# Patient Record
Sex: MALE | Race: WHITE | Employment: OTHER | URBAN - METROPOLITAN AREA
[De-identification: names, ages, dates, MRNs, and addresses within clinical notes are randomized per-mention and may not be internally consistent; named-entity substitution may affect disease eponyms.]

---

## 2017-06-28 ENCOUNTER — OFFICE VISIT (OUTPATIENT)
Dept: NEUROLOGY | Age: 67
End: 2017-06-28

## 2017-06-28 VITALS
RESPIRATION RATE: 16 BRPM | HEIGHT: 71 IN | DIASTOLIC BLOOD PRESSURE: 66 MMHG | OXYGEN SATURATION: 98 % | SYSTOLIC BLOOD PRESSURE: 124 MMHG | HEART RATE: 83 BPM | BODY MASS INDEX: 31.78 KG/M2 | WEIGHT: 227 LBS

## 2017-06-28 DIAGNOSIS — G25.0 ESSENTIAL TREMOR: ICD-10-CM

## 2017-06-28 DIAGNOSIS — R42 DIZZINESS AND GIDDINESS: ICD-10-CM

## 2017-06-28 DIAGNOSIS — I65.23 STENOSIS OF BOTH INTERNAL CAROTID ARTERIES: ICD-10-CM

## 2017-06-28 RX ORDER — FINASTERIDE 5 MG/1
5 TABLET, FILM COATED ORAL DAILY
Refills: 0 | COMMUNITY
Start: 2017-06-12

## 2017-06-28 RX ORDER — PRIMIDONE 50 MG/1
TABLET ORAL
Qty: 450 TAB | Refills: 5 | Status: SHIPPED | OUTPATIENT
Start: 2017-06-28 | End: 2018-01-16 | Stop reason: SDUPTHER

## 2017-06-28 NOTE — PROGRESS NOTES
Consult    Subjective:     Stephanie Velez is a 61 y.o. right-handed  male seen for evaluation of New problem of progressive difficulty with his tremors, more difficulty with his writing and signing checks, and patient has benign essential tremor and seen at the referral of Dr. Scar Laura. Patient already on Mysoline 100 mg twice a day, seems to be tolerating the medication well without drowsiness or sedation or ataxia or too much dizziness. He had some previous vertigo for which she saw ENT and was given vestibular therapy with slow gradual improvement in his symptoms and he seems to be more asymptomatic from that other than occasional slight lightheadedness when he stands up quickly. However he is also on 2 prostate medications that cause orthostasis, but he does have a rising PSA titer. Have been no history of cancer, just benign prostatic hypertrophy. We did a carotid Doppler study last year in the office that was unremarkable. He's had no other new focal weakness, sensory loss, visual change, or other new neurological problems except as above or other neurological problems except as above. We discussed various options for his tremor including ultrasound and deep brain stimulation and gave him information on deep brain stimulation. He's had normal thyroid testing in the past. He occasionally has to use 2 hands to hold a glass of liquid, and sometimes has to drink a glass of wine to sign checks more appropriately. . Stress and tension and fatigue makes his tremors worse, and the medicine helps, and occasionally wine, but not much else. He's had no weakness no sensory loss no headaches no visual symptoms no gait or balance problems. He had no family history of tremors. He's had no other new neurological symptoms, and is now retired. He has no family history of tremor    On complete review of systems and symptoms he has had no new medical problems complications or illnesses.  He has a history of essential tremor, hypertension, hyperlipidemia. Past Medical History:   Diagnosis Date    Enlarged prostate     Essential hypertension     Hyperlipemia     Sleep apnea       Past Surgical History:   Procedure Laterality Date    ABDOMEN SURGERY PROC UNLISTED       Family History   Problem Relation Age of Onset   [facility] Cancer Mother      breast    Dementia Father     Cancer Father      bladder      Social History   Substance Use Topics    Smoking status: Never Smoker    Smokeless tobacco: Never Used    Alcohol use Yes      Comment: socially       Current Outpatient Prescriptions   Medication Sig Dispense Refill    finasteride (PROSCAR) 5 mg tablet 5 mg daily. 0    primidone (MYSOLINE) 50 mg tablet 2 po QAM and 3 po  Tab 5    tamsulosin (FLOMAX) 0.4 mg capsule 0.4 mg daily. 0    simvastatin (ZOCOR) 40 mg tablet 20 mg nightly.  lisinopril (PRINIVIL, ZESTRIL) 10 mg tablet 10 mg daily.  aspirin delayed-release 81 mg tablet Take  by mouth daily.  Multivit,Ca,Iron-FA-Lyco-Lut (CENTRAL-JERMAINE) -578-250 mg-mcg-mcg-mcg tab Take  by mouth.  Cholecalciferol, Vitamin D3, (VITAMIN D3) 1,000 unit cap Take 1,000 Units by mouth daily. No Known Allergies     Review of Systems:  A comprehensive review of systems was negative except for: Neurological: positive for tremor   Vitals:    06/28/17 1112   BP: 124/66   Pulse: 83   Resp: 16   SpO2: 98%   Weight: 227 lb (103 kg)   Height: 5' 11\" (1.803 m)     Objective:     I      NEUROLOGICAL EXAM:    Appearance: The patient is well developed, well nourished, provides a coherent history and is in no acute distress. Mental Status: Oriented to time, place and person, and the president, cognitive function is normal, speech is fluent. Mood and affect appropriate. Cranial Nerves:   Intact visual fields. Fundi are benign. JACQUELINE, EOM's full, no nystagmus, no ptosis. Facial sensation is normal. Corneal reflexes are not tested.  Facial movement is symmetric. Hearing is normal bilaterally. Palate is midline with normal sternocleidomastoid and trapezius muscles are normal. Tongue is midline. Neck without meningismus or bruits  Temporal arteries are not tender or enlarged    Motor:  5/5 strength in upper and lower proximal and distal muscles. Normal bulk and tone. No fasciculations. Reflexes:   Deep tendon reflexes 2+/4 and symmetrical.  No babinski or clonus present  Patient has positive head hanging position vertigo when he sits up quickly, worse with head tilted to the right suggesting right vestibular dysfunction  Hearing is mildly decreased only   Sensory:   Normal to touch, pinprick and vibration and temperature. DSS is intact   Gait:  Normal gait. Tremor: Moderate bilateral intention tremor, right side as bad as the left   Cerebellar:  No cerebellar signs present. Neurovascular:  Normal heart sounds and regular rhythm, peripheral pulses decreased, and no carotid bruits. Assessment:         Plan:     Benign essential tremor that is progressive despite Mysoline 100 mg twice a day  Patient has probable benign positional vertigo, better after vestibular therapy  We will try to increase his Mysoline to 100 mg in the morning and 150 mg at night  His last levels showed a low phenobarbital level but a slightly elevated Mysoline level probably reflecting his taking the medication  New prescription given to patient today  We had long discussion with patient about other options like deep brain stimulation or ultrasound therapy  Information on deep brain stimulation given to the patient today  35 minutes spent with patient today discussing his prognosis, further treatment options and therapy  He is advised to take a multivitamin and vitamin D.  On a daily basis, remain mentally and physically active, exercise regularly  Followup in 6 months time or earlier if needed and patient will call for refills when needed    Signed By: Patricia Galindo MD June 28, 2017       This note will not be viewable in [address]

## 2017-06-28 NOTE — PATIENT INSTRUCTIONS

## 2017-06-28 NOTE — MR AVS SNAPSHOT
Visit Information Date & Time Provider Department Dept. Phone Encounter #  
 6/28/2017 11:00 AM Héctor Caba MD [facility] [phone] [id number] Follow-up Instructions Return in about 6 months (around 12/28/2017). Upcoming Health Maintenance Date Due Hepatitis C Screening 1950 DTaP/Tdap/Td series (1 - Tdap) 8/29/1973 FOBT Q 1 YEAR AGE 50-75 8/29/2002 ZOSTER VACCINE AGE 60> 8/29/2012 INFLUENZA AGE 9 TO ADULT 8/1/2017 Allergies as of 6/28/2017  Review Complete On: 6/28/2017 By: Héctor Caba MD  
 No Known Allergies Current Immunizations  Never Reviewed No immunizations on file. Not reviewed this visit You Were Diagnosed With   
  
 Codes Comments Vertigo of central origin of right ear    -  Primary ICD-10-CM: H81.41 
ICD-9-CM: 386.2 Essential tremor     ICD-10-CM: G25.0 ICD-9-CM: 333.1 Stenosis of both internal carotid arteries     ICD-10-CM: I65.23 ICD-9-CM: 433.10, 433.30 Dizziness and giddiness     ICD-10-CM: D43 ICD-9-CM: 780.4 Vitals BP Pulse Resp Height(growth percentile) Weight(growth percentile) SpO2  
 124/66 83 16 5' 11\" (1.803 m) 227 lb (103 kg) 98% BMI Smoking Status 31.66 kg/m2 Never Smoker Vitals History BMI and BSA Data Body Mass Index Body Surface Area  
 31.66 kg/m 2 2.27 m 2 Preferred Pharmacy Pharmacy Name Phone [id number] & [address] [phone] Your Updated Medication List  
  
   
This list is accurate as of: 6/28/17 11:45 AM.  Always use your most recent med list.  
  
  
  
  
 aspirin delayed-release 81 mg tablet Take  by mouth daily. CENTRAL-JERMAINE -041-250 mg-mcg-mcg-mcg Tab Generic drug:  Multivit,Ca,Iron-FA-Lyco-Lut Take  by mouth. finasteride 5 mg tablet Commonly known as:  PROSCAR  
5 mg daily. lisinopril 10 mg tablet Commonly known as:  PRINIVIL, ZESTRIL  
10 mg daily. primidone 50 mg tablet Commonly known as: MYSOLINE  
2 po QAM and 3 po QHS  
  
 simvastatin 40 mg tablet Commonly known as:  ZOCOR  
20 mg nightly. tamsulosin 0.4 mg capsule Commonly known as:  FLOMAX  
0.4 mg daily. VITAMIN D3 1,000 unit Cap Generic drug:  cholecalciferol Take 1,000 Units by mouth daily. Prescriptions Sent to Pharmacy Refills  
 primidone (MYSOLINE) 50 mg tablet 5 Si po QAM and 3 po QHS Class: Normal  
 Pharmacy: [address], [address] [facility] Ph #: [phone] Follow-up Instructions Return in about 6 months (around 2017). Patient Instructions A Healthy Lifestyle: Care Instructions Your Care Instructions A healthy lifestyle can help you feel good, stay at a healthy weight, and have plenty of energy for both work and play. A healthy lifestyle is something you can share with your whole family. A healthy lifestyle also can lower your risk for serious health problems, such as high blood pressure, heart disease, and diabetes. You can follow a few steps listed below to improve your health and the health of your family. Follow-up care is a key part of your treatment and safety. Be sure to make and go to all appointments, and call your doctor if you are having problems. Its also a good idea to know your test results and keep a list of the medicines you take. How can you care for yourself at home? · Do not eat too much sugar, fat, or fast foods. You can still have dessert and treats now and then. The goal is moderation. · Start small to improve your eating habits. Pay attention to portion sizes, drink less juice and soda pop, and eat more fruits and vegetables. ¨ Eat a healthy amount of food. A 3-ounce serving of meat, for example, is about the size of a deck of cards.  Fill the rest of your plate with vegetables and whole grains. ¨ Limit the amount of soda and sports drinks you have every day. Drink more water when you are thirsty. ¨ Eat at least 5 servings of fruits and vegetables every day. It may seem like a lot, but it is not hard to reach this goal. A serving or helping is 1 piece of fruit, 1 cup of vegetables, or 2 cups of leafy, raw vegetables. Have an apple or some carrot sticks as an afternoon snack instead of a candy bar. Try to have fruits and/or vegetables at every meal. 
· Make exercise part of your daily routine. You may want to start with simple activities, such as walking, bicycling, or slow swimming. Try to be active 30 to 60 minutes every day. You do not need to do all 30 to 60 minutes all at once. For example, you can exercise 3 times a day for 10 or 20 minutes. Moderate exercise is safe for most people, but it is always a good idea to talk to your doctor before starting an exercise program. 
· Keep moving. J[employer] the lawn, work in the garden, or Sharypic. Take the stairs instead of the elevator at work. · If you smoke, quit. People who smoke have an increased risk for heart attack, stroke, cancer, and other lung illnesses. Quitting is hard, but there are ways to boost your chance of quitting tobacco for good. ¨ Use nicotine gum, patches, or lozenges. ¨ Ask your doctor about stop-smoking programs and medicines. ¨ Keep trying. In addition to reducing your risk of diseases in the future, you will notice some benefits soon after you stop using tobacco. If you have shortness of breath or asthma symptoms, they will likely get better within a few weeks after you quit. · Limit how much alcohol you drink. Moderate amounts of alcohol (up to 2 drinks a day for men, 1 drink a day for women) are okay. But drinking too much can lead to liver problems, high blood pressure, and other health problems. Family health If you have a family, there are many things you can do together to improve your health. · Eat meals together as a family as often as possible. · Eat healthy foods. This includes fruits, vegetables, lean meats and dairy, and whole grains. · Include your family in your fitness plan. Most people think of activities such as jogging or tennis as the way to fitness, but there are many ways you and your family can be more active. Anything that makes you breathe hard and gets your heart pumping is exercise. Here are some tips: 
¨ Walk to do errands or to take your child to school or the bus. ¨ Go for a family bike ride after dinner instead of watching TV. Where can you learn more? Go to http://wadeAquaMobilemandy.info/. Enter U733 in the search box to learn more about \"A Healthy Lifestyle: Care Instructions. \" Current as of: July 26, 2016 Content Version: 11.3 © 2306-1397 [employer]. Care instructions adapted under license by [employer] (which disclaims liability or warranty for this information). If you have questions about a medical condition or this instruction, always ask your healthcare professional. [place] [address] any warranty or liability for your use of this information. Introducing [facility] [place] [facility]! [employer] introduces [employer] patient portal. Now you can access parts of your medical record, email your doctor's office, and request medication refills online. 1. In your internet browser, go to [email or web address] 2. Click on the First Time User? Click Here link in the Sign In box. You will see the New Member Sign Up page. 3. Enter your [employer] Access Code exactly as it appears below. You will not need to use this code after youve completed the sign-up process. If you do not sign up before the expiration date, you must request a new code. · [employer] Access Code: [email or web address] Expires: 9/26/2017 11:15 AM 
 
4.  Enter the last four digits of your Social Security Number (xxxx) and Date of Birth (mm/dd/yyyy) as indicated and click Submit. You will be taken to the next sign-up page. 5. Create a iWarda ID. This will be your iWarda login ID and cannot be changed, so think of one that is secure and easy to remember. 6. Create a iWarda password. You can change your password at any time. 7. Enter your Password Reset Question and Answer. This can be used at a later time if you forget your password. 8. Enter your e-mail address. You will receive e-mail notification when new information is available in [address] 9. Click Sign Up. You can now view and download portions of your medical record. 10. Click the Download Summary menu link to download a portable copy of your medical information. If you have questions, please visit the Frequently Asked Questions section of the iWarda website. Remember, iWarda is NOT to be used for urgent needs. For medical emergencies, dial 911. Now available from your iPhone and Android! Please provide this summary of care documentation to your next provider. Your primary care clinician is listed as Roxane Hong. If you have any questions after today's visit, please call [phone].

## 2017-06-28 NOTE — LETTER
6/28/2017 11:57 AM 
 
Patient:  Veronica Kidd YOB: 1950 Date of Visit: 6/28/2017 Dear No Recipients: Thank you for referring Mr. Veronica Kidd to me for evaluation/treatment. Below are the relevant portions of my assessment and plan of care. Consult Subjective:  
 
Veronica Kidd is a 61 y.o. right-handed  male seen for evaluation of New problem of progressive difficulty with his tremors, more difficulty with his writing and signing checks, and patient has benign essential tremor and seen at the referral of Dr. Gustavo Denis. Patient already on Mysoline 100 mg twice a day, seems to be tolerating the medication well without drowsiness or sedation or ataxia or too much dizziness. He had some previous vertigo for which she saw ENT and was given vestibular therapy with slow gradual improvement in his symptoms and he seems to be more asymptomatic from that other than occasional slight lightheadedness when he stands up quickly. However he is also on 2 prostate medications that cause orthostasis, but he does have a rising PSA titer. Have been no history of cancer, just benign prostatic hypertrophy. We did a carotid Doppler study last year in the office that was unremarkable. He's had no other new focal weakness, sensory loss, visual change, or other new neurological problems except as above or other neurological problems except as above. We discussed various options for his tremor including ultrasound and deep brain stimulation and gave him information on deep brain stimulation. He's had normal thyroid testing in the past. He occasionally has to use 2 hands to hold a glass of liquid, and sometimes has to drink a glass of wine to sign checks more appropriately. . Stress and tension and fatigue makes his tremors worse, and the medicine helps, and occasionally wine, but not much else.  He's had no weakness no sensory loss no headaches no visual symptoms no gait or balance problems. He had no family history of tremors. He's had no other new neurological symptoms, and is now retired. He has no family history of tremor On complete review of systems and symptoms he has had no new medical problems complications or illnesses. He has a history of essential tremor, hypertension, hyperlipidemia. Past Medical History:  
Diagnosis Date  Enlarged prostate  Essential hypertension  Hyperlipemia  Sleep apnea Past Surgical History:  
Procedure Laterality Date [address] UNLISTED Family History Problem Relation Age of Onset  Cancer Mother   
  breast  
 Dementia Father  Cancer Father   
  bladder Social History Substance Use Topics  Smoking status: Never Smoker  Smokeless tobacco: Never Used  Alcohol use Yes Comment: socially Current Outpatient Prescriptions Medication Sig Dispense Refill  finasteride (PROSCAR) 5 mg tablet 5 mg daily. 0  
 primidone (MYSOLINE) 50 mg tablet 2 po QAM and 3 po  Tab 5  tamsulosin (FLOMAX) 0.4 mg capsule 0.4 mg daily. 0  
 simvastatin (ZOCOR) 40 mg tablet 20 mg nightly.  lisinopril (PRINIVIL, ZESTRIL) 10 mg tablet 10 mg daily.  aspirin delayed-release 81 mg tablet Take  by mouth daily.  Multivit,Ca,Iron-FA-Lyco-Lut (CENTRAL-JERMAINE) -512-250 mg-mcg-mcg-mcg tab Take  by mouth.  Cholecalciferol, Vitamin D3, (VITAMIN D3) 1,000 unit cap Take 1,000 Units by mouth daily. No Known Allergies Review of Systems: A comprehensive review of systems was negative except for: Neurological: positive for tremor Vitals:  
 06/28/17 1112 BP: 124/66 Pulse: 83 Resp: 16 SpO2: 98% Weight: 227 lb (103 kg) Height: 5' 11\" (1.803 m) Objective: I 
 
 
NEUROLOGICAL EXAM: 
 
Appearance:   The patient is well developed, well nourished, provides a coherent history and is in no acute distress. Mental Status: Oriented to time, place and person, and the president, cognitive function is normal, speech is fluent. Mood and affect appropriate. Cranial Nerves:   Intact visual fields. Fundi are benign. JACQUELINE, EOM's full, no nystagmus, no ptosis. Facial sensation is normal. Corneal reflexes are not tested. Facial movement is symmetric. Hearing is normal bilaterally. Palate is midline with normal sternocleidomastoid and trapezius muscles are normal. Tongue is midline. Neck without meningismus or bruits Temporal arteries are not tender or enlarged Motor:  5/5 strength in upper and lower proximal and distal muscles. Normal bulk and tone. No fasciculations. Reflexes:   Deep tendon reflexes 2+/4 and symmetrical. 
No babinski or clonus present Patient has positive head hanging position vertigo when he sits up quickly, worse with head tilted to the right suggesting right vestibular dysfunction Hearing is mildly decreased only Sensory:   Normal to touch, pinprick and vibration and temperature. DSS is intact Gait:  Normal gait. Tremor: Moderate bilateral intention tremor, right side as bad as the left Cerebellar:  No cerebellar signs present. Neurovascular:  Normal heart sounds and regular rhythm, peripheral pulses decreased, and no carotid bruits. Assessment:  
 
 
 
Plan:  
 
Benign essential tremor that is progressive despite Mysoline 100 mg twice a day Patient has probable benign positional vertigo, better after vestibular therapy We will try to increase his Mysoline to 100 mg in the morning and 150 mg at night His last levels showed a low phenobarbital level but a slightly elevated Mysoline level probably reflecting his taking the medication New prescription given to patient today We had long discussion with patient about other options like deep brain stimulation or ultrasound therapy Information on deep brain stimulation given to the patient today 35 minutes spent with patient today discussing his prognosis, further treatment options and therapy He is advised to take a multivitamin and vitamin D. On a daily basis, remain mentally and physically active, exercise regularly Followup in 6 months time or earlier if needed and patient will call for refills when needed Signed By: Blanche Romano MD   
 June 28, 2017 This note will not be viewable in [address] If you have questions, please do not hesitate to call me. I look forward to following Mr. Valdez Mason along with you. Sincerely, Blanche Romano MD

## 2018-01-16 ENCOUNTER — OFFICE VISIT (OUTPATIENT)
Dept: NEUROLOGY | Age: 68
End: 2018-01-16

## 2018-01-16 VITALS
OXYGEN SATURATION: 98 % | HEIGHT: 71 IN | HEART RATE: 76 BPM | SYSTOLIC BLOOD PRESSURE: 140 MMHG | BODY MASS INDEX: 30.94 KG/M2 | DIASTOLIC BLOOD PRESSURE: 76 MMHG | WEIGHT: 221 LBS

## 2018-01-16 DIAGNOSIS — I65.23 STENOSIS OF BOTH INTERNAL CAROTID ARTERIES: ICD-10-CM

## 2018-01-16 DIAGNOSIS — G25.0 ESSENTIAL TREMOR: Primary | ICD-10-CM

## 2018-01-16 DIAGNOSIS — R42 DIZZINESS AND GIDDINESS: ICD-10-CM

## 2018-01-16 RX ORDER — PRIMIDONE 50 MG/1
TABLET ORAL
Qty: 150 TAB | Refills: 11 | Status: SHIPPED | OUTPATIENT
Start: 2018-01-16 | End: 2019-02-01 | Stop reason: SDUPTHER

## 2018-01-16 RX ORDER — MECLIZINE HYDROCHLORIDE 25 MG/1
25 TABLET ORAL
Qty: 100 TAB | Refills: 11 | Status: SHIPPED | OUTPATIENT
Start: 2018-01-16 | End: 2018-01-26

## 2018-01-16 NOTE — PATIENT INSTRUCTIONS
Please be advised there is a $25 fee for all paperwork to be completed from our  providers. This is to be paid by the patient prior to picking up the completed forms. A Healthy Lifestyle: Care Instructions  Your Care Instructions    A healthy lifestyle can help you feel good, stay at a healthy weight, and have plenty of energy for both work and play. A healthy lifestyle is something you can share with your whole family. A healthy lifestyle also can lower your risk for serious health problems, such as high blood pressure, heart disease, and diabetes. You can follow a few steps listed below to improve your health and the health of your family. Follow-up care is a key part of your treatment and safety. Be sure to make and go to all appointments, and call your doctor if you are having problems. It's also a good idea to know your test results and keep a list of the medicines you take. How can you care for yourself at home? · Do not eat too much sugar, fat, or fast foods. You can still have dessert and treats now and then. The goal is moderation. · Start small to improve your eating habits. Pay attention to portion sizes, drink less juice and soda pop, and eat more fruits and vegetables. ¨ Eat a healthy amount of food. A 3-ounce serving of meat, for example, is about the size of a deck of cards. Fill the rest of your plate with vegetables and whole grains. ¨ Limit the amount of soda and sports drinks you have every day. Drink more water when you are thirsty. ¨ Eat at least 5 servings of fruits and vegetables every day. It may seem like a lot, but it is not hard to reach this goal. A serving or helping is 1 piece of fruit, 1 cup of vegetables, or 2 cups of leafy, raw vegetables. Have an apple or some carrot sticks as an afternoon snack instead of a candy bar. Try to have fruits and/or vegetables at every meal.  · Make exercise part of your daily routine.  You may want to start with simple activities, such as walking, bicycling, or slow swimming. Try to be active 30 to 60 minutes every day. You do not need to do all 30 to 60 minutes all at once. For example, you can exercise 3 times a day for 10 or 20 minutes. Moderate exercise is safe for most people, but it is always a good idea to talk to your doctor before starting an exercise program.  · Keep moving. [place] the lawn, work in the garden, or [place]. Take the stairs instead of the elevator at work. · If you smoke, quit. People who smoke have an increased risk for heart attack, stroke, cancer, and other lung illnesses. Quitting is hard, but there are ways to boost your chance of quitting tobacco for good. ¨ Use nicotine gum, patches, or lozenges. ¨ Ask your doctor about stop-smoking programs and medicines. ¨ Keep trying. In addition to reducing your risk of diseases in the future, you will notice some benefits soon after you stop using tobacco. If you have shortness of breath or asthma symptoms, they will likely get better within a few weeks after you quit. · Limit how much alcohol you drink. Moderate amounts of alcohol (up to 2 drinks a day for men, 1 drink a day for women) are okay. But drinking too much can lead to liver problems, high blood pressure, and other health problems. Family health  If you have a family, there are many things you can do together to improve your health. · Eat meals together as a family as often as possible. · Eat healthy foods. This includes fruits, vegetables, lean meats and dairy, and whole grains. · Include your family in your fitness plan. Most people think of activities such as jogging or tennis as the way to fitness, but there are many ways you and your family can be more active. Anything that makes you breathe hard and gets your heart pumping is exercise. Here are some tips:  ¨ Walk to do errands or to take your child to school or the bus. ¨ Go for a family bike ride after dinner instead of watching TV.   Where can you learn more? Go to http://wade-mandy.info/. Enter V988 in the search box to learn more about \"A Healthy Lifestyle: Care Instructions. \"  Current as of: May 12, 2017  Content Version: 11.4  © 5707-8608 [employer], [employer]. Care instructions adapted under license by [employer] (which disclaims liability or warranty for this information). If you have questions about a medical condition or this instruction, always ask your healthcare professional. [address] any warranty or liability for your use of this information.

## 2018-01-16 NOTE — LETTER
1/16/2018 2:59 PM 
 
Patient:  Nanci Casarez YOB: 1950 Date of Visit: 1/16/2018 Dear No Recipients: Thank you for referring Mr. Nanci Casarez to me for evaluation/treatment. Below are the relevant portions of my assessment and plan of care. Consult Subjective:  
 
Nanci Casarez is a 74 y.o. right-handed  male seen for evaluation of New problem of increasing vertigo when he stands up quickly or moves his head quickly, for the last several weeks, might have gotten a little bit better and he started exercising more, but still persistent, and was seeing him at the request of Dr. Kenia Figueroa. Patient has had some vestibular dysfunction in the past, and went to therapy with ENT, but that got better and is not sure what brought on this more recent bout. He has not had any hearing loss or tinnitus in his ears. He has had no new focal weakness or sensory loss, and his tremor seems to be stable 100 Mysoline in the morning and 150 mg at night. He does complain of some drowsiness, but does not bother him all that much. His exam shows only mild positional vertigo on Hallpike Outing maneuver. We will check a carotid Doppler study to make sure there is no change in his carotid stenosis this is been 2 years since his last Doppler. However he is also on 2 prostate medications that cause orthostasis, but he does have a rising PSA titer. Have been no history of cancer, just benign prostatic hypertrophy. He's had no other new focal weakness, sensory loss, visual change, or other new neurological problems except as above or other neurological problems except as above. We discussed various options for his tremor including ultrasound and deep brain stimulation and gave him information on deep brain stimulation.  He's had normal thyroid testing in the past. He occasionally has to use 2 hands to hold a glass of liquid, and sometimes has to drink a glass of wine to sign checks more appropriately. . Stress and tension and fatigue makes his tremors worse, and the medicine helps, and occasionally wine, but not much else. He's had no weakness no sensory loss no headaches no visual symptoms no gait or balance problems. He had no family history of tremors. He's had no other new neurological symptoms, and is now retired. He has no family history of tremor On complete review of systems and symptoms he has had no new medical problems complications or illnesses. He has a history of essential tremor, hypertension, hyperlipidemia. Past Medical History:  
Diagnosis Date  Enlarged prostate  Essential hypertension  Hyperlipemia  Sleep apnea Past Surgical History:  
Procedure Laterality Date [address] UNLISTED Family History Problem Relation Age of Onset  Cancer Mother   
  breast  
 Dementia Father  Cancer Father   
  bladder Social History Substance Use Topics  Smoking status: Never Smoker  Smokeless tobacco: Never Used  Alcohol use Yes Comment: socially Current Outpatient Prescriptions Medication Sig Dispense Refill  primidone (MYSOLINE) 50 mg tablet 2 po QAM and 3 po  Tab 11  
 meclizine (ANTIVERT) 25 mg tablet Take 1 Tab by mouth three (3) times daily as needed for up to 10 days. 100 Tab 11  
 finasteride (PROSCAR) 5 mg tablet 5 mg daily. 0  
 tamsulosin (FLOMAX) 0.4 mg capsule 0.4 mg daily. 0  
 simvastatin (ZOCOR) 40 mg tablet 20 mg nightly.  lisinopril (PRINIVIL, ZESTRIL) 10 mg tablet 10 mg daily.  aspirin delayed-release 81 mg tablet Take  by mouth daily.  Multivit,Ca,Iron-FA-Lyco-Lut (CENTRAL-JERMAINE) -453-250 mg-mcg-mcg-mcg tab Take  by mouth.  Cholecalciferol, Vitamin D3, (VITAMIN D3) 1,000 unit cap Take 1,000 Units by mouth daily. No Known Allergies Review of Systems: A comprehensive review of systems was negative except for: Neurological: positive for tremor Vitals:  
 01/16/18 1342 BP: 140/76 Pulse: 76 SpO2: 98% Weight: 221 lb (100.2 kg) Height: 5' 11\" (1.803 m) Objective: I 
 
 
NEUROLOGICAL EXAM: 
 
Appearance: The patient is well developed, well nourished, provides a coherent history and is in no acute distress. Mental Status: Oriented to time, place and person, and the president, cognitive function is normal, speech is fluent. Mood and affect appropriate. Cranial Nerves:   Intact visual fields. Fundi are benign. JACQUELINE, EOM's full, no nystagmus, no ptosis. Facial sensation is normal. Corneal reflexes are not tested. Facial movement is symmetric. Hearing is normal bilaterally. Palate is midline with normal sternocleidomastoid and trapezius muscles are normal. Tongue is midline. Neck without meningismus or bruits Temporal arteries are not tender or enlarged Motor:  5/5 strength in upper and lower proximal and distal muscles. Normal bulk and tone. No fasciculations. Reflexes:   Deep tendon reflexes 2+/4 and symmetrical. 
No babinski or clonus present Patient has positive head hanging position vertigo when he sits up quickly, worse with head tilted to the right suggesting right vestibular dysfunction Hearing is mildly decreased only Sensory:   Normal to touch, pinprick and vibration and temperature. DSS is intact Gait:  Normal gait. Tremor: Moderate bilateral intention tremor, right side as bad as the left Cerebellar:   Mildly abnormal Romberg and tandem cerebellar signs present. Neurovascular:  Normal heart sounds and regular rhythm, peripheral pulses decreased, and no carotid bruits.   
 
 
 
 
Assessment:  
 
 
 
Plan:  
 
Patient with worsening positional vertigo, probably vestibular dysfunction, we gave him Antivert to use as needed and recommend that he get back on his vestibular exercise program. 
 We will check carotid Doppler study to make sure there is no progressive cerebrovascular insufficiency Benign essential tremor that is only mildly progressive on Mysoline 100 mg in the morning and 150 mg at night and patient tolerating therapy fairly well Patient has probable benign positional vertigo, better after vestibular therapy We will continue his Mysoline to 100 mg in the morning and 150 mg at night His last levels showed a low phenobarbital level but a slightly elevated Mysoline level probably reflecting his taking the medication New prescription given to patient today We had long discussion with patient about other options like deep brain stimulation or ultrasound therapy Information on deep brain stimulation given to the patient today 35 minutes spent with patient today discussing his prognosis, further treatment options and therapy He is advised to take a multivitamin and vitamin D. On a daily basis, remain mentally and physically active, exercise regularly Followup in 6 months time or earlier if needed and patient will call for refills when needed Signed By: Lynn Pringle MD   
 January 16, 2018 This note will not be viewable in [address] If you have questions, please do not hesitate to call me. I look forward to following Mr. Adrianna Ahumada along with you. Sincerely, Lynn Pringle MD

## 2018-01-16 NOTE — PROGRESS NOTES
Consult    Subjective:     Tiny Mcguire is a 74 y.o. right-handed  male seen for evaluation of New problem of increasing vertigo when he stands up quickly or moves his head quickly, for the last several weeks, might have gotten a little bit better and he started exercising more, but still persistent, and was seeing him at the request of Dr. Avni Rodríguez. Patient has had some vestibular dysfunction in the past, and went to therapy with ENT, but that got better and is not sure what brought on this more recent bout. He has not had any hearing loss or tinnitus in his ears. He has had no new focal weakness or sensory loss, and his tremor seems to be stable 100 Mysoline in the morning and 150 mg at night. He does complain of some drowsiness, but does not bother him all that much. His exam shows only mild positional vertigo on Hallpike Clifford maneuver. We will check a carotid Doppler study to make sure there is no change in his carotid stenosis this is been 2 years since his last Doppler. However he is also on 2 prostate medications that cause orthostasis, but he does have a rising PSA titer. Have been no history of cancer, just benign prostatic hypertrophy. He's had no other new focal weakness, sensory loss, visual change, or other new neurological problems except as above or other neurological problems except as above. We discussed various options for his tremor including ultrasound and deep brain stimulation and gave him information on deep brain stimulation. He's had normal thyroid testing in the past. He occasionally has to use 2 hands to hold a glass of liquid, and sometimes has to drink a glass of wine to sign checks more appropriately. . Stress and tension and fatigue makes his tremors worse, and the medicine helps, and occasionally wine, but not much else. He's had no weakness no sensory loss no headaches no visual symptoms no gait or balance problems. He had no family history of tremors.  He's had no other new neurological symptoms, and is now retired. He has no family history of tremor    On complete review of systems and symptoms he has had no new medical problems complications or illnesses. He has a history of essential tremor, hypertension, hyperlipidemia. Past Medical History:   Diagnosis Date    Enlarged prostate     Essential hypertension     Hyperlipemia     Sleep apnea       Past Surgical History:   Procedure Laterality Date    ABDOMEN SURGERY PROC UNLISTED       Family History   Problem Relation Age of Onset   [facility] Cancer Mother      breast    Dementia Father     Cancer Father      bladder      Social History   Substance Use Topics    Smoking status: Never Smoker    Smokeless tobacco: Never Used    Alcohol use Yes      Comment: socially       Current Outpatient Prescriptions   Medication Sig Dispense Refill    primidone (MYSOLINE) 50 mg tablet 2 po QAM and 3 po  Tab 11    meclizine (ANTIVERT) 25 mg tablet Take 1 Tab by mouth three (3) times daily as needed for up to 10 days. 100 Tab 11    finasteride (PROSCAR) 5 mg tablet 5 mg daily. 0    tamsulosin (FLOMAX) 0.4 mg capsule 0.4 mg daily. 0    simvastatin (ZOCOR) 40 mg tablet 20 mg nightly.  lisinopril (PRINIVIL, ZESTRIL) 10 mg tablet 10 mg daily.  aspirin delayed-release 81 mg tablet Take  by mouth daily.  Multivit,Ca,Iron-FA-Lyco-Lut (CENTRAL-JERMAINE) -228-250 mg-mcg-mcg-mcg tab Take  by mouth.  Cholecalciferol, Vitamin D3, (VITAMIN D3) 1,000 unit cap Take 1,000 Units by mouth daily. No Known Allergies     Review of Systems:  A comprehensive review of systems was negative except for: Neurological: positive for tremor   Vitals:    01/16/18 1342   BP: 140/76   Pulse: 76   SpO2: 98%   Weight: 221 lb (100.2 kg)   Height: 5' 11\" (1.803 m)     Objective:     I      NEUROLOGICAL EXAM:    Appearance: The patient is well developed, well nourished, provides a coherent history and is in no acute distress. Mental Status: Oriented to time, place and person, and the president, cognitive function is normal, speech is fluent. Mood and affect appropriate. Cranial Nerves:   Intact visual fields. Fundi are benign. JACQUELINE, EOM's full, no nystagmus, no ptosis. Facial sensation is normal. Corneal reflexes are not tested. Facial movement is symmetric. Hearing is normal bilaterally. Palate is midline with normal sternocleidomastoid and trapezius muscles are normal. Tongue is midline. Neck without meningismus or bruits  Temporal arteries are not tender or enlarged    Motor:  5/5 strength in upper and lower proximal and distal muscles. Normal bulk and tone. No fasciculations. Reflexes:   Deep tendon reflexes 2+/4 and symmetrical.  No babinski or clonus present  Patient has positive head hanging position vertigo when he sits up quickly, worse with head tilted to the right suggesting right vestibular dysfunction  Hearing is mildly decreased only   Sensory:   Normal to touch, pinprick and vibration and temperature. DSS is intact   Gait:  Normal gait. Tremor: Moderate bilateral intention tremor, right side as bad as the left   Cerebellar:   Mildly abnormal Romberg and tandem cerebellar signs present. Neurovascular:  Normal heart sounds and regular rhythm, peripheral pulses decreased, and no carotid bruits.            Assessment:         Plan:     Patient with worsening positional vertigo, probably vestibular dysfunction, we gave him Antivert to use as needed and recommend that he get back on his vestibular exercise program.  We will check carotid Doppler study to make sure there is no progressive cerebrovascular insufficiency  Benign essential tremor that is only mildly progressive on Mysoline 100 mg in the morning and 150 mg at night and patient tolerating therapy fairly well  Patient has probable benign positional vertigo, better after vestibular therapy  We will continue his Mysoline to 100 mg in the morning and 150 mg at night  His last levels showed a low phenobarbital level but a slightly elevated Mysoline level probably reflecting his taking the medication  New prescription given to patient today  We had long discussion with patient about other options like deep brain stimulation or ultrasound therapy  Information on deep brain stimulation given to the patient today  35 minutes spent with patient today discussing his prognosis, further treatment options and therapy  He is advised to take a multivitamin and vitamin D. On a daily basis, remain mentally and physically active, exercise regularly  Followup in 6 months time or earlier if needed and patient will call for refills when needed    Signed By: Mau Melendez MD     January 16, 2018       This note will not be viewable in [address]

## 2018-01-16 NOTE — MR AVS SNAPSHOT
[address], 
[id number], [address] [facility] 
[phone] Patient: Manuelito Alanis MRN: [id number] FCK:8/97/9827 Visit Information Date & Time Provider Department Dept. Phone Encounter #  
 1/16/2018  1:40 PM Bharathi Sullivan MD [facility] [phone] [id number] Follow-up Instructions Return in about 6 months (around 7/16/2018). Upcoming Health Maintenance Date Due Hepatitis C Screening 1950 DTaP/Tdap/Td series (1 - Tdap) 8/29/1973 FOBT Q 1 YEAR AGE 50-75 8/29/2002 ZOSTER VACCINE AGE 60> 6/29/2012 Influenza Age 5 to Adult 8/1/2017 GLAUCOMA SCREENING Q2Y 8/29/2017 Pneumococcal 65+ Low/Medium Risk (1 of 2 - PCV13) 8/29/2017 MEDICARE YEARLY EXAM 8/29/2017 Allergies as of 1/16/2018  Review Complete On: 1/16/2018 By: Bhartahi Sullivan MD  
 No Known Allergies Current Immunizations  Never Reviewed No immunizations on file. Not reviewed this visit You Were Diagnosed With   
  
 Codes Comments Essential tremor    -  Primary ICD-10-CM: G25.0 ICD-9-CM: 333.1 Vertigo of central origin of right ear     ICD-10-CM: H81.41 
ICD-9-CM: 386.2 Stenosis of both internal carotid arteries     ICD-10-CM: I65.23 ICD-9-CM: 433.10, 433.30 Dizziness and giddiness     ICD-10-CM: U50 ICD-9-CM: 780.4 Vitals BP Pulse Height(growth percentile) Weight(growth percentile) SpO2 BMI  
 140/76 76 5' 11\" (1.803 m) 221 lb (100.2 kg) 98% 30.82 kg/m2 Smoking Status Never Smoker BMI and BSA Data Body Mass Index Body Surface Area  
 30.82 kg/m 2 2.24 m 2 Preferred Pharmacy Pharmacy Name Phone [id number] & [address] [phone] Your Updated Medication List  
  
   
This list is accurate as of: 1/16/18  2:08 PM.  Always use your most recent med list.  
  
  
  
 aspirin delayed-release 81 mg tablet Take  by mouth daily. CENTRAL-JERMAINE -854-250 mg-mcg-mcg-mcg Tab Generic drug:  Multivit,Ca,Iron-FA-Lyco-Lut Take  by mouth. finasteride 5 mg tablet Commonly known as:  PROSCAR  
5 mg daily. lisinopril 10 mg tablet Commonly known as:  PRINIVIL, ZESTRIL  
10 mg daily. meclizine 25 mg tablet Commonly known as:  ANTIVERT Take 1 Tab by mouth three (3) times daily as needed for up to 10 days. primidone 50 mg tablet Commonly known as: MYSOLINE  
2 po QAM and 3 po QHS  
  
 simvastatin 40 mg tablet Commonly known as:  ZOCOR  
20 mg nightly. tamsulosin 0.4 mg capsule Commonly known as:  FLOMAX  
0.4 mg daily. VITAMIN D3 1,000 unit Cap Generic drug:  cholecalciferol Take 1,000 Units by mouth daily. Prescriptions Sent to Pharmacy Refills  
 primidone (MYSOLINE) 50 mg tablet 11 Si po QAM and 3 po QHS Class: Normal  
 [facility] Ph #: [phone]  
 meclizine (ANTIVERT) 25 mg tablet 11 Sig: Take 1 Tab by mouth three (3) times daily as needed for up to 10 days. Class: Normal  
 Pharmacy: [facility] [place], [address] [place] [address],[address] [facility] [address] Ph #: [phone] Route: Oral  
  
Follow-up Instructions Return in about 6 months (around 2018). To-Do List   
 2018 Imaging:  DUPLEX CAROTID BILATERAL AMB NEURO Patient Instructions Please be advised there is a $25 fee for all paperwork to be completed from our  providers. This is to be paid by the patient prior to picking up the completed forms. A Healthy Lifestyle: Care Instructions Your Care Instructions A healthy lifestyle can help you feel good, stay at a healthy weight, and have plenty of energy for both work and play. A healthy lifestyle is something you can share with your whole family. A healthy lifestyle also can lower your risk for serious health problems, such as high blood pressure, heart disease, and diabetes. You can follow a few steps listed below to improve your health and the health of your family. Follow-up care is a key part of your treatment and safety. Be sure to make and go to all appointments, and call your doctor if you are having problems. It's also a good idea to know your test results and keep a list of the medicines you take. How can you care for yourself at home? · Do not eat too much sugar, fat, or fast foods. You can still have dessert and treats now and then. The goal is moderation. · Start small to improve your eating habits. Pay attention to portion sizes, drink less juice and soda pop, and eat more fruits and vegetables. ¨ Eat a healthy amount of food. A 3-ounce serving of meat, for example, is about the size of a deck of cards. Fill the rest of your plate with vegetables and whole grains. ¨ Limit the amount of soda and sports drinks you have every day. Drink more water when you are thirsty. ¨ Eat at least 5 servings of fruits and vegetables every day. It may seem like a lot, but it is not hard to reach this goal. A serving or helping is 1 piece of fruit, 1 cup of vegetables, or 2 cups of leafy, raw vegetables. Have an apple or some carrot sticks as an afternoon snack instead of a candy bar. Try to have fruits and/or vegetables at every meal. 
· Make exercise part of your daily routine. You may want to start with simple activities, such as walking, bicycling, or slow swimming. Try to be active 30 to 60 minutes every day. You do not need to do all 30 to 60 minutes all at once. For example, you can exercise 3 times a day for 10 or 20 minutes. Moderate exercise is safe for most people, but it is always a good idea to talk to your doctor before starting an exercise program. 
· Keep moving. Evalee Pascual the lawn, work in the garden, or [employer].  Take the stairs instead of the elevator at work. · If you smoke, quit. People who smoke have an increased risk for heart attack, stroke, cancer, and other lung illnesses. Quitting is hard, but there are ways to boost your chance of quitting tobacco for good. ¨ Use nicotine gum, patches, or lozenges. ¨ Ask your doctor about stop-smoking programs and medicines. ¨ Keep trying. In addition to reducing your risk of diseases in the future, you will notice some benefits soon after you stop using tobacco. If you have shortness of breath or asthma symptoms, they will likely get better within a few weeks after you quit. · Limit how much alcohol you drink. Moderate amounts of alcohol (up to 2 drinks a day for men, 1 drink a day for women) are okay. But drinking too much can lead to liver problems, high blood pressure, and other health problems. Family health If you have a family, there are many things you can do together to improve your health. · Eat meals together as a family as often as possible. · Eat healthy foods. This includes fruits, vegetables, lean meats and dairy, and whole grains. · Include your family in your fitness plan. Most people think of activities such as jogging or tennis as the way to fitness, but there are many ways you and your family can be more active. Anything that makes you breathe hard and gets your heart pumping is exercise. Here are some tips: 
¨ Walk to do errands or to take your child to school or the bus. ¨ Go for a family bike ride after dinner instead of watching TV. Where can you learn more? Go to http://wade-mandy.info/. Enter I489 in the search box to learn more about \"A Healthy Lifestyle: Care Instructions. \" Current as of: May 12, 2017 Content Version: 11.4 © 6153-5894 Healthwise, [employer].  Care instructions adapted under license by [employer] (which disclaims liability or warranty for this information). If you have questions about a medical condition or this instruction, always ask your healthcare professional. [address] any warranty or liability for your use of this information. Introducing [facility] SERVICES! Cira Barraza introduces [email or web address] patient portal. Now you can access parts of your medical record, email your doctor's office, and request medication refills online. 1. In your internet browser, go to [email or web address] 2. Click on the First Time User? Click Here link in the Sign In box. You will see the New Member Sign Up page. 3. Enter your [email or web address] Access Code exactly as it appears below. You will not need to use this code after youve completed the sign-up process. If you do not sign up before the expiration date, you must request a new code. · [email or web address] Access Code: [email or web address] Expires: 4/16/2018  1:43 PM 
 
4. Enter the last four digits of your Social Security Number (xxxx) and Date of Birth (mm/dd/yyyy) as indicated and click Submit. You will be taken to the next sign-up page. 5. Create a [email or web address] ID. This will be your [email or web address] login ID and cannot be changed, so think of one that is secure and easy to remember. 6. Create a [email or web address] password. You can change your password at any time. 7. Enter your Password Reset Question and Answer. This can be used at a later time if you forget your password. 8. Enter your e-mail address. You will receive e-mail notification when new information is available in [address] 9. Click Sign Up. You can now view and download portions of your medical record. 10. Click the Download Summary menu link to download a portable copy of your medical information. If you have questions, please visit the Frequently Asked Questions section of the [email or web address] website. Remember, [email or web address] is NOT to be used for urgent needs. For medical emergencies, dial 911. Now available from your iPhone and Android! Please provide this summary of care documentation to your next provider. Your primary care clinician is listed as Rebecca Dumont. If you have any questions after today's visit, please call [phone].

## 2018-01-17 NOTE — PROCEDURES
This study consisted of pulsed wave Doppler examination, Color-flow imaging, and Duplex imaging of both the right and left carotid systems, and both vertebral arteries.        Imaging of both right and left carotid systems showed mild mixed plaquing at the bifurcations and proximal and distal internal and external carotid arteries bilaterally, with stenosis in the range of 0-15% only and with no flow abnormalities identified.        Both vertebral arteries showed normal antegrade flow.         Clinical correlation recommended

## 2018-07-27 ENCOUNTER — OFFICE VISIT (OUTPATIENT)
Dept: NEUROLOGY | Age: 68
End: 2018-07-27

## 2018-07-27 VITALS
WEIGHT: 230 LBS | RESPIRATION RATE: 12 BRPM | HEART RATE: 76 BPM | DIASTOLIC BLOOD PRESSURE: 68 MMHG | SYSTOLIC BLOOD PRESSURE: 128 MMHG | OXYGEN SATURATION: 97 % | BODY MASS INDEX: 32.2 KG/M2 | HEIGHT: 71 IN

## 2018-07-27 DIAGNOSIS — G25.0 ESSENTIAL TREMOR: ICD-10-CM

## 2018-07-27 DIAGNOSIS — I65.23 STENOSIS OF BOTH INTERNAL CAROTID ARTERIES: ICD-10-CM

## 2018-07-27 DIAGNOSIS — R42 DIZZINESS AND GIDDINESS: ICD-10-CM

## 2018-07-27 DIAGNOSIS — G44.229 CHRONIC TENSION-TYPE HEADACHE, NOT INTRACTABLE: ICD-10-CM

## 2018-07-27 NOTE — MR AVS SNAPSHOT
[address], 
[address], [address] [facility] 
[phone] Patient: Marlene Fay MRN: [id number] BFT:8/57/7967 Visit Information Date & Time Provider Department Dept. Phone Encounter #  
 7/27/2018  2:20 PM Tejal Figueredo MD [facility]rology [facility] at [facility] [phone] [id number] Follow-up Instructions Return in about 1 year (around 7/27/2019). Upcoming Health Maintenance Date Due Hepatitis C Screening 1950 DTaP/Tdap/Td series (1 - Tdap) 8/29/1973 FOBT Q 1 YEAR AGE 50-75 8/29/2002 ZOSTER VACCINE AGE 60> 6/29/2012 GLAUCOMA SCREENING Q2Y 8/29/2017 Pneumococcal 65+ Low/Medium Risk (1 of 2 - PCV13) 8/29/2017 MEDICARE YEARLY EXAM 3/14/2018 Influenza Age 5 to Adult 8/1/2018 Allergies as of 7/27/2018  Review Complete On: 7/27/2018 By: Tejal Figueredo MD  
 No Known Allergies Current Immunizations  Never Reviewed No immunizations on file. Not reviewed this visit Vitals BP Pulse Resp Height(growth percentile) Weight(growth percentile) SpO2  
 128/68 76 12 5' 11\" (1.803 m) 230 lb (104.3 kg) 97% BMI Smoking Status 32.08 kg/m2 Never Smoker Vitals History BMI and BSA Data Body Mass Index Body Surface Area 32.08 kg/m 2 2.29 m 2 [facility]erred Pharmacy [id number] [address] [phone] Your Updated Medication List  
  
   
This list is accurate as of 7/27/18  3:14 PM.  Always use your most recent med list.  
  
  
  
  
 aspirin delayed-release 81 mg tablet Take  by mouth daily. CENTRAL-JERMAINE -813-250 mg-mcg-mcg-mcg Tab Generic drug:  Multivit,Ca,Iron-FA-Lyco-Lut Take  by mouth. finasteride 5 mg tablet Commonly known as:  PROSCAR  
5 mg daily. lisinopril 10 mg tablet Commonly known as:  Waldemar Clarke  
 10 mg daily. primidone 50 mg tablet Commonly known as: MYSOLINE  
2 po QAM and 3 po QHS  
  
 simvastatin 40 mg tablet Commonly known as:  ZOCOR  
20 mg nightly. tamsulosin 0.4 mg capsule Commonly known as:  FLOMAX  
0.4 mg daily. VITAMIN D3 1,000 unit Cap Generic drug:  cholecalciferol Take 1,000 Units by mouth daily. Follow-up Instructions Return in about 1 year (around 7/27/2019). Patient Instructions Office Policies · Phone calls/patient messages: Please allow up to 24 hours for someone in the office to contact you about your call or message. Be mindful your provider may be out of the office or your message may require further review. We encourage you to use [employer] for your messages as this is a faster, more efficient way to communicate with our office · Medication Refills: 
Prescription medications require up to 48 business hours to process. We encourage you to use [employer] for your refills. For controlled medications: Please allow up to 72 business hours to process. Certain medications may require you to  a written prescription at our office. NO narcotic/controlled medications will be prescribed after 4pm Monday through Friday or on weekends · Form/Paperwork Completion: 
Please note there is a $25 fee for all paperwork completed by our providers. We ask that you allow 7-14 business days. Pre-payment is due prior to picking up/faxing the completed form. You may also download your forms to [employer] to have your doctor print off. Introducing [facility] HE[facility] SERVICES! Carly Villa introduces [employer] patient portal. Now you can access parts of your medical record, email your doctor's office, and request medication refills online. 1. In your internet browser, go to [email or web address] 2. Click on the First Time User? Click Here link in the Sign In box. You will see the New Member Sign Up page. 3. Enter your [employer] Access Code exactly as it appears below. You will not need to use this code after youve completed the sign-up process. If you do not sign up before the expiration date, you must request a new code. · [employer] Access Code: [email or web address] Expires: 10/25/2018  2:10 PM 
 
4. Enter the last four digits of your Social Security Number (xxxx) and Date of Birth (mm/dd/yyyy) as indicated and click Submit. You will be taken to the next sign-up page. 5. Create a [employer] ID. This will be your [employer] login ID and cannot be changed, so think of one that is secure and easy to remember. 6. Create a [employer] password. You can change your password at any time. 7. Enter your Password Reset Question and Answer. This can be used at a later time if you forget your password. 8. Enter your e-mail address. You will receive e-mail notification when new information is available in [address] 9. Click Sign Up. You can now view and download portions of your medical record. 10. Click the Download Summary menu link to download a portable copy of your medical information. If you have questions, please visit the Frequently Asked Questions section of the [employer] website. Remember, [employer] is NOT to be used for urgent needs. For medical emergencies, dial 911. Now available from your iPhone and Android! Please provide this summary of care documentation to your next provider. Your primary care clinician is listed as Loraine Mora. If you have any questions after today's visit, please call [phone].

## 2018-07-27 NOTE — LETTER
7/27/2018 8:55 PM 
 
Patient:  Vern Kimble YOB: 1950 Date of Visit: 7/27/2018 Dear No Recipients: Thank you for referring Mr. Vern Kimble to me for evaluation/treatment. Below are the relevant portions of my assessment and plan of care. Consult Subjective:  
 
Vern Kimble is a 74 y.o. right-handed  male seen for evaluation of New problem of increasing back pain that does not seem to be responding to pain management and intermittent epidural steroid injections, and the patient wanting to know would be another good surgeon to see for second opinion, and I recommended Dr. Justin Kilgore and Dr. Cordell Cooney as second opinion as for his back problem, but also recommended that he see his pain management physician to consider other treatment options like Lidoderm patches, nerve blocks, TENS unit, and spinal cord stimulators. The patient is also seen for his essential tremor is currently taking 50 mg Mysoline tablets taking 2 in the morning, and 3 at night and doing well with the tremor and that does not seem to bother him at this time he is able to live with it even though it affects his handwriting more. He has had positional vertigo that seems to be better now after he got therapy and tried Antivert, and just does his exercises intermittently when the vertigo recurs. His carotid Doppler studies done 6 months ago were relatively unremarkable for any significant obstructive disease. He has not had any hearing loss or tinnitus in his ears. He has had no new focal weakness or sensory loss, and his tremor seems to be stable 100 Mysoline in the morning and 150 mg at night. He does complain of some drowsiness, but does not bother him all that much. His exam shows only mild positional vertigo on Hallpike Ramona maneuver. However he is also on 2 prostate medications that cause orthostasis, but he does have a rising PSA titer.   He has no history of cancer, just benign prostatic hypertrophy. He's had no other new focal weakness, sensory loss, visual change, or other new neurological problems except as above or other neurological problems except as above. We discussed various options for his tremor including ultrasound and deep brain stimulation and gave him information on deep brain stimulation. He's had normal thyroid testing in the past. He occasionally has to use 2 hands to hold a glass of liquid, and sometimes has to drink a glass of wine to sign checks more appropriately. . Stress and tension and fatigue makes his tremors worse, and the medicine helps, and occasionally wine, but not much else. He's had no weakness no sensory loss no headaches no visual symptoms no gait or balance problems. He had no family history of tremors. He's had no other new neurological symptoms, and is now retired. He has no family history of tremor On complete review of systems and symptoms he has had no new medical problems complications or illnesses. He has a history of essential tremor, hypertension, hyperlipidemia. Past Medical History:  
Diagnosis Date  Enlarged prostate  Essential hypertension  Hyperlipemia  Sleep apnea Past Surgical History:  
Procedure Laterality Date [address] UNLISTED Family History Problem Relation Age of Onset  Cancer Mother   
  breast  
 Dementia Father  Cancer Father   
  bladder Social History Substance Use Topics  Smoking status: Never Smoker  Smokeless tobacco: Never Used  Alcohol use Yes Comment: socially Current Outpatient Prescriptions Medication Sig Dispense Refill  primidone (MYSOLINE) 50 mg tablet 2 po QAM and 3 po  Tab 11  
 finasteride (PROSCAR) 5 mg tablet 5 mg daily. 0  
 tamsulosin (FLOMAX) 0.4 mg capsule 0.4 mg daily. 0  
 simvastatin (ZOCOR) 40 mg tablet 20 mg nightly.  lisinopril (PRINIVIL, ZESTRIL) 10 mg tablet 10 mg daily.  aspirin delayed-release 81 mg tablet Take  by mouth daily.  Multivit,Ca,Iron-FA-Lyco-Lut (CENTRAL-JERMAINE) -989-250 mg-mcg-mcg-mcg tab Take  by mouth.  Cholecalciferol, Vitamin D3, (VITAMIN D3) 1,000 unit cap Take 1,000 Units by mouth daily. No Known Allergies Review of Systems: A comprehensive review of systems was negative except for: Neurological: positive for tremor Vitals:  
 07/27/18 1440 BP: 128/68 Pulse: 76 Resp: 12 SpO2: 97% Weight: 230 lb (104.3 kg) Height: 5' 11\" (1.803 m) Objective: I 
 
 
NEUROLOGICAL EXAM: 
 
Appearance: The patient is well developed, well nourished, provides a coherent history and is in no acute distress. Mental Status: Oriented to time, place and person, and the president, cognitive function is normal, speech is fluent. Mood and affect appropriate. Cranial Nerves:   Intact visual fields. Fundi are benign. JACQUELINE, EOM's full, no nystagmus, no ptosis. Facial sensation is normal. Corneal reflexes are not tested. Facial movement is symmetric. Hearing is normal bilaterally. Palate is midline with normal sternocleidomastoid and trapezius muscles are normal. Tongue is midline. Neck without meningismus or bruits Temporal arteries are not tender or enlarged Motor:  5/5 strength in upper and lower proximal and distal muscles. Normal bulk and tone. No fasciculations. Patient does move slowly because of his back pain at times. Reflexes:   Deep tendon reflexes 2+/4 and symmetrical. 
No babinski or clonus present Patient has positive head hanging position vertigo when he sits up quickly, worse with head tilted to the right suggesting right vestibular dysfunction Hearing is mildly decreased only Sensory:   Normal to touch, pinprick and vibration and temperature. DSS is intact Gait:  Normal gait. Tremor:    Moderate bilateral intention tremor, right side as bad as the left  
Cerebellar:   Mildly abnormal Romberg and tandem cerebellar signs present. Neurovascular:  Normal heart sounds and regular rhythm, peripheral pulses decreased, and no carotid bruits. Assessment:  
 
 
 
Plan: For second opinion about his back we recommended the neurosurgeon here or Dr. Shannan Delgadillo Patient with positional vertigo, probably vestibular dysfunction, we gave him Antivert to use as needed and recommend and he will continue his vestibular exercises at home which seem to be helping him control his vertigo. His repeat carotid Doppler study 6 months ago was okay Benign essential tremor that is only mildly progressive on Mysoline 100 mg in the morning and 150 mg at night and patient tolerating therapy fairly well Patient has probable benign positional vertigo, better after vestibular therapy We will continue his Mysoline to 100 mg in the morning and 150 mg at night New prescription given to patient today We had long discussion with patient about other options like deep brain stimulation or ultrasound therapy Information on deep brain stimulation given to the patient today 35 minutes spent with patient today discussing his prognosis, further treatment options and therapy He is advised to take a multivitamin and vitamin D. On a daily basis, remain mentally and physically active, exercise regularly Followup in 6 months time or earlier if needed and patient will call for refills when needed Signed By: Arnol Melvin MD   
 July 27, 2018 This note will not be viewable in [address] If you have questions, please do not hesitate to call me. I look forward to following Mr. Eliane Gracia along with you. Sincerely, Arnol Melvin MD

## 2018-07-28 NOTE — PROGRESS NOTES
Consult Subjective:  
 
Britany Dai is a 74 y.o. right-handed  male seen for evaluation at the request of Dr. Jessica Varela of problem with progressive tremors that are slowly worsening with time, but he does not feel he actually needs to take more medication yet, even though they are worsening. Patient already on Mysoline 100 mg in the morning and 150 mg at night. The patient is also seen for his essential tremor is currently taking 50 mg Mysoline tablets taking 2 in the morning, and 3 at night and doing well with the tremor and that does not seem to bother him at this time he is able to live with it even though it affects his handwriting more. He has had positional vertigo that seems to be better now after he got therapy and tried Antivert, and just does his exercises intermittently when the vertigo recurs. His carotid Doppler studies done 6 months ago were relatively unremarkable for any significant obstructive disease. He has not had any hearing loss or tinnitus in his ears. He has had no new focal weakness or sensory loss, and his tremor seems to be stable 100 Mysoline in the morning and 150 mg at night. He does complain of some drowsiness, but does not bother him all that much. His exam shows only mild positional vertigo on Hallpike Turner maneuver. However he is also on 2 prostate medications that cause orthostasis, but he does have a rising PSA titer. He has no history of cancer, just benign prostatic hypertrophy. He's had no other new focal weakness, sensory loss, visual change, or other new neurological problems except as above or other neurological problems except as above. We discussed various options for his tremor including ultrasound and deep brain stimulation and gave him information on deep brain stimulation.  He's had normal thyroid testing in the past. He occasionally has to use 2 hands to hold a glass of liquid, and sometimes has to drink a glass of wine to sign checks more appropriately. . Stress and tension and fatigue makes his tremors worse, and the medicine helps, and occasionally wine, but not much else. He's had no weakness no sensory loss no headaches no visual symptoms no gait or balance problems. He had no family history of tremors. He's had no other new neurological symptoms, and is now retired. He has no family history of tremor On complete review of systems and symptoms he has had no new medical problems complications or illnesses. He has a history of essential tremor, hypertension, hyperlipidemia. Past Medical History:  
Diagnosis Date  Enlarged prostate  Essential hypertension  Hyperlipemia  Sleep apnea Past Surgical History:  
Procedure Laterality Date [address] UNLISTED Family History Problem Relation Age of Onset  Cancer Mother   
  breast  
 Dementia Father  Cancer Father   
  bladder Social History Substance Use Topics  Smoking status: Never Smoker  Smokeless tobacco: Never Used  Alcohol use Yes Comment: socially Current Outpatient Prescriptions Medication Sig Dispense Refill  primidone (MYSOLINE) 50 mg tablet 2 po QAM and 3 po  Tab 11  
 finasteride (PROSCAR) 5 mg tablet 5 mg daily. 0  
 tamsulosin (FLOMAX) 0.4 mg capsule 0.4 mg daily. 0  
 simvastatin (ZOCOR) 40 mg tablet 20 mg nightly.  lisinopril (PRINIVIL, ZESTRIL) 10 mg tablet 10 mg daily.  aspirin delayed-release 81 mg tablet Take  by mouth daily.  Multivit,Ca,Iron-FA-Lyco-Lut (CENTRAL-JERMAINE) -637-250 mg-mcg-mcg-mcg tab Take  by mouth.  Cholecalciferol, Vitamin D3, (VITAMIN D3) 1,000 unit cap Take 1,000 Units by mouth daily. No Known Allergies Review of Systems: A comprehensive review of systems was negative except for: Neurological: positive for tremor Vitals:  
 07/27/18 1440 BP: 128/68 Pulse: 76 Resp: 12 SpO2: 97% Weight: 230 lb (104.3 kg) Height: 5' 11\" (1.803 m) Objective: I 
 
 
NEUROLOGICAL EXAM: 
 
Appearance: The patient is well developed, well nourished, provides a coherent history and is in no acute distress. Mental Status: Oriented to time, place and person, and the president, cognitive function is normal, speech is fluent. Mood and affect appropriate. Cranial Nerves:   Intact visual fields. Fundi are benign. JACQUELINE, EOM's full, no nystagmus, no ptosis. Facial sensation is normal. Corneal reflexes are not tested. Facial movement is symmetric. Hearing is normal bilaterally. Palate is midline with normal sternocleidomastoid and trapezius muscles are normal. Tongue is midline. Neck without meningismus or bruits Temporal arteries are not tender or enlarged Motor:  5/5 strength in upper and lower proximal and distal muscles. Normal bulk and tone. No fasciculations. Patient does move slowly because of his back pain at times. Reflexes:   Deep tendon reflexes 2+/4 and symmetrical. 
No babinski or clonus present Patient has positive head hanging position vertigo when he sits up quickly, worse with head tilted to the right suggesting right vestibular dysfunction Hearing is mildly decreased only Sensory:   Normal to touch, pinprick and vibration and temperature. DSS is intact Gait:  Normal gait. Tremor: Moderate bilateral intention tremor, right side as bad as the left Cerebellar:   Mildly abnormal Romberg and tandem cerebellar signs present. Neurovascular:  Normal heart sounds and regular rhythm, peripheral pulses decreased, and no carotid bruits. Assessment:  
 
 
 
Plan:  
 
 
Patient with positional vertigo, probably vestibular dysfunction, we gave him Antivert to use as needed and recommend and he will continue his vestibular exercises at home which seem to be helping him control his vertigo. His repeat carotid Doppler study 6 months ago was okay Benign essential tremor that is only mildly progressive on Mysoline 100 mg in the morning and 150 mg at night and patient tolerating therapy fairly well Patient has probable benign positional vertigo, better after vestibular therapy We will continue his Mysoline to 100 mg in the morning and 150 mg at night New prescription given to patient today We had long discussion with patient about other options like deep brain stimulation or ultrasound therapy Information on deep brain stimulation given to the patient today 35 minutes spent with patient today discussing his prognosis, further treatment options and therapy He is advised to take a multivitamin and vitamin D. On a daily basis, remain mentally and physically active, exercise regularly Followup in 6 months time or earlier if needed and patient will call for refills when needed Signed By: Maryann Diaz MD   
 July 27, 2018 This note will not be viewable in [address]

## 2019-02-01 DIAGNOSIS — R42 DIZZINESS AND GIDDINESS: ICD-10-CM

## 2019-02-01 DIAGNOSIS — I65.23 STENOSIS OF BOTH INTERNAL CAROTID ARTERIES: ICD-10-CM

## 2019-02-01 DIAGNOSIS — G25.0 ESSENTIAL TREMOR: ICD-10-CM

## 2019-02-01 NOTE — TELEPHONE ENCOUNTER
Future Appointments   Date Time Provider Christine Eliza   7/12/2019  3:00 PM Sharon Glasgow MD [address] [place] [address]                         Last Appointment My Department:  7/27/18    Please advise of refill below.   Requested Prescriptions     Pending Prescriptions Disp Refills    primidone (MYSOLINE) 50 mg tablet [Pharmacy Med Name: PRIMIDONE 50 MG TABLET] 150 Tab 11     Sig: take 2 tablets by mouth every morning then 3 tablets by mouth at bedtime

## 2019-02-03 RX ORDER — PRIMIDONE 50 MG/1
TABLET ORAL
Qty: 150 TAB | Refills: 11 | Status: SHIPPED | OUTPATIENT
Start: 2019-02-03 | End: 2019-07-12 | Stop reason: SDUPTHER

## 2019-07-12 ENCOUNTER — OFFICE VISIT (OUTPATIENT)
Dept: NEUROLOGY | Age: 69
End: 2019-07-12

## 2019-07-12 VITALS
OXYGEN SATURATION: 97 % | BODY MASS INDEX: 32.76 KG/M2 | HEART RATE: 88 BPM | HEIGHT: 71 IN | WEIGHT: 234 LBS | DIASTOLIC BLOOD PRESSURE: 76 MMHG | SYSTOLIC BLOOD PRESSURE: 128 MMHG | RESPIRATION RATE: 16 BRPM

## 2019-07-12 DIAGNOSIS — G25.0 ESSENTIAL TREMOR: ICD-10-CM

## 2019-07-12 DIAGNOSIS — R42 DIZZINESS AND GIDDINESS: ICD-10-CM

## 2019-07-12 DIAGNOSIS — I65.23 STENOSIS OF BOTH INTERNAL CAROTID ARTERIES: Primary | ICD-10-CM

## 2019-07-12 DIAGNOSIS — G44.229 CHRONIC TENSION-TYPE HEADACHE, NOT INTRACTABLE: ICD-10-CM

## 2019-07-12 RX ORDER — PRIMIDONE 50 MG/1
TABLET ORAL
Qty: 180 TAB | Refills: 11 | Status: SHIPPED | OUTPATIENT
Start: 2019-07-12 | End: 2020-04-08 | Stop reason: SDUPTHER

## 2019-07-12 RX ORDER — PRIMIDONE 250 MG/1
250 TABLET ORAL
Qty: 90 TAB | Refills: 4 | Status: SHIPPED | OUTPATIENT
Start: 2019-07-12 | End: 2020-08-21 | Stop reason: ALTCHOICE

## 2019-07-12 RX ORDER — LOSARTAN POTASSIUM 50 MG/1
TABLET ORAL DAILY
Refills: 0 | COMMUNITY
Start: 2019-06-21

## 2019-07-12 NOTE — PATIENT INSTRUCTIONS
A Healthy Lifestyle: Care Instructions Your Care Instructions A healthy lifestyle can help you feel good, stay at a healthy weight, and have plenty of energy for both work and play. A healthy lifestyle is something you can share with your whole family. A healthy lifestyle also can lower your risk for serious health problems, such as high blood pressure, heart disease, and diabetes. You can follow a few steps listed below to improve your health and the health of your family. Follow-up care is a key part of your treatment and safety. Be sure to make and go to all appointments, and call your doctor if you are having problems. It's also a good idea to know your test results and keep a list of the medicines you take. How can you care for yourself at home? · Do not eat too much sugar, fat, or fast foods. You can still have dessert and treats now and then. The goal is moderation. · Start small to improve your eating habits. Pay attention to portion sizes, drink less juice and soda pop, and eat more fruits and vegetables. ? Eat a healthy amount of food. A 3-ounce serving of meat, for example, is about the size of a deck of cards. Fill the rest of your plate with vegetables and whole grains. ? Limit the amount of soda and sports drinks you have every day. Drink more water when you are thirsty. ? Eat at least 5 servings of fruits and vegetables every day. It may seem like a lot, but it is not hard to reach this goal. A serving or helping is 1 piece of fruit, 1 cup of vegetables, or 2 cups of leafy, raw vegetables. Have an apple or some carrot sticks as an afternoon snack instead of a candy bar. Try to have fruits and/or vegetables at every meal. 
· Make exercise part of your daily routine. You may want to start with simple activities, such as walking, bicycling, or slow swimming. Try to be active 30 to 60 minutes every day.  You do not need to do all 30 to 60 minutes all at once. For example, you can exercise 3 times a day for 10 or 20 minutes. Moderate exercise is safe for most people, but it is always a good idea to talk to your doctor before starting an exercise program. 
· Keep moving. [place] [employer] the lawn, work in the garden, or [employer]. Take the stairs instead of the elevator at work. · If you smoke, quit. People who smoke have an increased risk for heart attack, stroke, cancer, and other lung illnesses. Quitting is hard, but there are ways to boost your chance of quitting tobacco for good. ? Use nicotine gum, patches, or lozenges. ? Ask your doctor about stop-smoking programs and medicines. ? Keep trying. In addition to reducing your risk of diseases in the future, you will notice some benefits soon after you stop using tobacco. If you have shortness of breath or asthma symptoms, they will likely get better within a few weeks after you quit. · Limit how much alcohol you drink. Moderate amounts of alcohol (up to 2 drinks a day for men, 1 drink a day for women) are okay. But drinking too much can lead to liver problems, high blood pressure, and other health problems. Family health If you have a family, there are many things you can do together to improve your health. · Eat meals together as a family as often as possible. · Eat healthy foods. This includes fruits, vegetables, lean meats and dairy, and whole grains. · Include your family in your fitness plan. Most people think of activities such as jogging or tennis as the way to fitness, but there are many ways you and your family can be more active. Anything that makes you breathe hard and gets your heart pumping is exercise. Here are some tips: 
? Walk to do errands or to take your child to school or the bus. 
? Go for a family bike ride after dinner instead of watching TV. Where can you learn more? Go to http://wade-mandy.info/. Enter F664 in the search box to learn more about \"A Healthy Lifestyle: Care Instructions. \" Current as of: September 11, 2018 Content Version: 11.9 © 4573-5802 [employer] Care instructions adapted under license by [employer] (which disclaims liability or warranty for this information). If you have questions about a medical condition or this instruction, always ask your healthcare professional. [address] any warranty or liability for your use of this information. Office Policieso Phone calls/patient messages: Please allow up to 24 hours for someone in the office to contact you about your call or message. Be mindful your provider may be out of the office or your message may require further review. We encourage you to use [email or web address] for your messages as this is a faster, more efficient way to communicate with our office 
o Medication Refills: 
Prescription medications require up to 48 business hours to process. We encourage you to use [email or web address] for your refills. For controlled medications: Please allow up to 72 business hours to process. Certain medications may require you to  a written prescription at our office. NO narcotic/controlled medications will be prescribed after 4pm Monday through Friday or on weekends 
o Form/Paperwork Completion: We ask that you allow 7-14 business days. You may also download your forms to [email or web address] to have your doctor print off.

## 2019-07-12 NOTE — PROGRESS NOTES
Consult    Subjective:     Letitia Villasenor is a 79 y.o. right-handed  male seen for evaluation at the request of Dr. Mya Pacheco of new problem with progressive tremors that are slowly worsening with time, and interfering with his ability to function, since at work he has to write reports a new break and read his handwriting is becoming disabled from this. We will try increasing his Mysoline to 250 mg at night and continue the 100 mg in the morning. Is currently on 150 mg at night now. We also discussed deep brain stimulation and other non-medicinal therapeutic options like ultrasound therapy, and gave him information to read on DBS, and he will think about it. He has had positional vertigo that seems to be better now after he got therapy and tried Antivert, and just does his exercises intermittently when the vertigo recurs. His carotid Doppler studies done 6 months ago were relatively unremarkable for any significant obstructive disease. He has not had any hearing loss or tinnitus in his ears. He has had no new focal weakness or sensory loss, vision problems gait problems headaches, meningismus, or any other new neurological complaints. However he is also on 2 prostate medications that cause orthostasis, but he does have a rising PSA titer. He has no history of cancer, just benign prostatic hypertrophy. He's had no other new focal weakness, sensory loss, visual change, or other new neurological problems except as above or other neurological problems except as above. We discussed various options for his tremor including ultrasound and deep brain stimulation and gave him information on deep brain stimulation. He's had normal thyroid testing in the past. He occasionally has to use 2 hands to hold a glass of liquid, and sometimes has to drink a glass of wine to sign checks more appropriately. . Stress and tension and fatigue makes his tremors worse, and the medicine helps, and occasionally wine, but not much else. He's had no weakness no sensory loss no headaches no visual symptoms no gait or balance problems. He had no family history of tremors. He's had no other new neurological symptoms, and is now retired. He has no family history of tremor    On complete review of systems and symptoms he has had no new medical problems complications or illnesses. He has a history of essential tremor, hypertension, hyperlipidemia. Past Medical History:   Diagnosis Date    Enlarged prostate     Essential hypertension     Hyperlipemia     Sleep apnea       Past Surgical History:   Procedure Laterality Date    ABDOMEN SURGERY PROC UNLISTED       Family History   Problem Relation Age of Onset   26 [facility] Cancer Mother         breast    Dementia Father     Cancer Father         bladder      Social History     Tobacco Use    Smoking status: Never Smoker    Smokeless tobacco: Never Used   Substance Use Topics    Alcohol use: Yes     Comment: socially       Current Outpatient Medications   Medication Sig Dispense Refill    losartan (COZAAR) 50 mg tablet daily. 0    primidone (MYSOLINE) 50 mg tablet take 2 tablets by mouth every morning 180 Tab 11    primidone (MYSOLINE) 250 mg tablet Take 1 Tab by mouth nightly. 90 Tab 4    finasteride (PROSCAR) 5 mg tablet 5 mg daily. 0    tamsulosin (FLOMAX) 0.4 mg capsule 0.4 mg daily. 0    simvastatin (ZOCOR) 40 mg tablet 20 mg nightly.  aspirin delayed-release 81 mg tablet Take  by mouth daily.  Multivit,Ca,Iron-FA-Lyco-Lut (CENTRAL-JERMAINE) -878-250 mg-mcg-mcg-mcg tab Take  by mouth.  Cholecalciferol, Vitamin D3, (VITAMIN D3) 1,000 unit cap Take 1,000 Units by mouth daily.           No Known Allergies     Review of Systems:  A comprehensive review of systems was negative except for: Neurological: positive for tremor   Vitals:    07/12/19 1442   BP: 128/76   Pulse: 88   Resp: 16   SpO2: 97%   Weight: 234 lb (106.1 kg)   Height: 5' 11\" (1.803 m)     Objective: I      NEUROLOGICAL EXAM:    Appearance: The patient is well developed, well nourished, provides a coherent history and is in no acute distress. Mental Status: Oriented to time, place and person, and the president, cognitive function is normal, speech is fluent. Mood and affect appropriate. Cranial Nerves:   Intact visual fields. Fundi are benign, discs are flat. JACQUELINE, EOM's full, no nystagmus, no ptosis. Facial sensation is normal. Corneal reflexes are not tested. Facial movement is symmetric. Hearing is normal bilaterally. Palate is midline with normal sternocleidomastoid and trapezius muscles are normal. Tongue is midline. Neck without meningismus or bruits  Temporal arteries are not tender or enlarged    Motor:  5/5 strength in upper and lower proximal and distal muscles. Normal bulk and tone. No fasciculations. Patient does move slowly because of his back pain at times. Rapid altering movement is slow but symmetric bilaterally   Reflexes:   Deep tendon reflexes 2+/4 and symmetrical.  No babinski or clonus present  Patient has positive head hanging position vertigo when he sits up quickly, worse with head tilted to the right suggesting right vestibular dysfunction  Hearing is mildly decreased only   Sensory:   Normal to touch, pinprick and vibration and temperature. DSS is intact   Gait:  Normal gait. Tremor: Moderate bilateral intention tremor, right side as bad as the left   Cerebellar:   Mildly abnormal Romberg and tandem cerebellar signs present  And finger-nose-finger examination is positive for the prominent tremor. Neurovascular:  Normal heart sounds and regular rhythm, peripheral pulses decreased, and no carotid bruits.            Assessment:         Plan:     Patient with worsening tremor, we will first try to increase his Mysoline from 150 mg at night to 250 mg at night and continue the 100 mg in the morning, he says he is having no side effects on the medication however he can tolerate this dose. Patient also given information on other nonmedicinal therapies including deep brain stimulation and ultrasound therapy, we gave him some information to read on deep brain stimulation. We offered to refer him to Dr. Chayo Tom but he will read the information first and then call us back. Patient with positional vertigo, probably vestibular dysfunction, we gave him Antivert to use as needed and recommend and he will continue his vestibular exercises at home which seem to be helping him control his vertigo. His repeat carotid Doppler study 6 months ago was okay  Benign essential tremor that is progressive on Mysoline 100 mg in the morning and 150 mg at night We will continue his Mysoline to 100 mg in the morning and 150 mg at night  New prescription given to patient today  We had long discussion with patient about other options like deep brain stimulation or ultrasound therapy  Information on deep brain stimulation given to the patient today  35 minutes spent with patient today discussing his prognosis, further treatment options and therapy  He is advised to take a multivitamin and vitamin D. On a daily basis, remain mentally and physically active, exercise regularly  Followup in 6 months time or earlier if needed and patient will call for refills when needed    Signed By: Kellie Malagon MD     July 12, 2019       This note will not be viewable in [address]

## 2019-07-12 NOTE — LETTER
7/12/2019 5:25 PM 
 
Patient:  Wero Toussaint YOB: 1950 Date of Visit: 7/12/2019 Dear No Recipients: Thank you for referring Mr. Wero Toussaint to me for evaluation/treatment. Below are the relevant portions of my assessment and plan of care. Consult Subjective:  
 
Wero Toussaint is a 79 y.o. right-handed  male seen for evaluation at the request of Dr. Amina Godoy of new problem with progressive tremors that are slowly worsening with time, and interfering with his ability to function, since at work he has to write reports a new break and read his handwriting is becoming disabled from this. We will try increasing his Mysoline to 250 mg at night and continue the 100 mg in the morning. Is currently on 150 mg at night now. We also discussed deep brain stimulation and other non-medicinal therapeutic options like ultrasound therapy, and gave him information to read on DBS, and he will think about it. He has had positional vertigo that seems to be better now after he got therapy and tried Antivert, and just does his exercises intermittently when the vertigo recurs. His carotid Doppler studies done 6 months ago were relatively unremarkable for any significant obstructive disease. He has not had any hearing loss or tinnitus in his ears. He has had no new focal weakness or sensory loss, vision problems gait problems headaches, meningismus, or any other new neurological complaints. However he is also on 2 prostate medications that cause orthostasis, but he does have a rising PSA titer. He has no history of cancer, just benign prostatic hypertrophy. He's had no other new focal weakness, sensory loss, visual change, or other new neurological problems except as above or other neurological problems except as above.  We discussed various options for his tremor including ultrasound and deep brain stimulation and gave him information on deep brain stimulation. He's had normal thyroid testing in the past. He occasionally has to use 2 hands to hold a glass of liquid, and sometimes has to drink a glass of wine to sign checks more appropriately. . Stress and tension and fatigue makes his tremors worse, and the medicine helps, and occasionally wine, but not much else. He's had no weakness no sensory loss no headaches no visual symptoms no gait or balance problems. He had no family history of tremors. He's had no other new neurological symptoms, and is now retired. He has no family history of tremor On complete review of systems and symptoms he has had no new medical problems complications or illnesses. He has a history of essential tremor, hypertension, hyperlipidemia. Past Medical History:  
Diagnosis Date  Enlarged prostate  Essential hypertension  Hyperlipemia  Sleep apnea Past Surgical History:  
Procedure Laterality Date [address] [place] UNLISTED Family History Problem Relation Age of Onset  Cancer Mother   
     breast  
 Dementia Father  Cancer Father   
     bladder Social History Tobacco Use  Smoking status: Never Smoker  Smokeless tobacco: Never Used Substance Use Topics  Alcohol use: Yes Comment: socially Current Outpatient Medications Medication Sig Dispense Refill  losartan (COZAAR) 50 mg tablet daily. 0  
 primidone (MYSOLINE) 50 mg tablet take 2 tablets by mouth every morning 180 Tab 11  
 primidone (MYSOLINE) 250 mg tablet Take 1 Tab by mouth nightly. 90 Tab 4  
 finasteride (PROSCAR) 5 mg tablet 5 mg daily. 0  
 tamsulosin (FLOMAX) 0.4 mg capsule 0.4 mg daily. 0  
 simvastatin (ZOCOR) 40 mg tablet 20 mg nightly.  aspirin delayed-release 81 mg tablet Take  by mouth daily.  Multivit,Ca,Iron-FA-Lyco-Lut (CENTRAL-JERMAINE) -199-250 mg-mcg-mcg-mcg tab Take  by mouth.  Cholecalciferol, Vitamin D3, (VITAMIN D3) 1,000 unit cap Take 1,000 Units by mouth daily. No Known Allergies Review of Systems: A comprehensive review of systems was negative except for: Neurological: positive for tremor Vitals:  
 07/12/19 1442 BP: 128/76 Pulse: 88 Resp: 16 SpO2: 97% Weight: 234 lb (106.1 kg) Height: 5' 11\" (1.803 m) Objective: I 
 
 
NEUROLOGICAL EXAM: 
 
Appearance: The patient is well developed, well nourished, provides a coherent history and is in no acute distress. Mental Status: Oriented to time, place and person, and the president, cognitive function is normal, speech is fluent. Mood and affect appropriate. Cranial Nerves:   Intact visual fields. Fundi are benign, discs are flat. JACQUELINE, EOM's full, no nystagmus, no ptosis. Facial sensation is normal. Corneal reflexes are not tested. Facial movement is symmetric. Hearing is normal bilaterally. Palate is midline with normal sternocleidomastoid and trapezius muscles are normal. Tongue is midline. Neck without meningismus or bruits Temporal arteries are not tender or enlarged Motor:  5/5 strength in upper and lower proximal and distal muscles. Normal bulk and tone. No fasciculations. Patient does move slowly because of his back pain at times. Rapid altering movement is slow but symmetric bilaterally Reflexes:   Deep tendon reflexes 2+/4 and symmetrical. 
No babinski or clonus present Patient has positive head hanging position vertigo when he sits up quickly, worse with head tilted to the right suggesting right vestibular dysfunction Hearing is mildly decreased only Sensory:   Normal to touch, pinprick and vibration and temperature. DSS is intact Gait:  Normal gait. Tremor: Moderate bilateral intention tremor, right side as bad as the left Cerebellar:   Mildly abnormal Romberg and tandem cerebellar signs present And finger-nose-finger examination is positive for the prominent tremor. Neurovascular:  Normal heart sounds and regular rhythm, peripheral pulses decreased, and no carotid bruits. Assessment:  
 
 
 
Plan:  
 
Patient with worsening tremor, we will first try to increase his Mysoline from 150 mg at night to 250 mg at night and continue the 100 mg in the morning, he says he is having no side effects on the medication however he can tolerate this dose. Patient also given information on other nonmedicinal therapies including deep brain stimulation and ultrasound therapy, we gave him some information to read on deep brain stimulation. We offered to refer him to Dr. Vicente Spicer but he will read the information first and then call us back. Patient with positional vertigo, probably vestibular dysfunction, we gave him Antivert to use as needed and recommend and he will continue his vestibular exercises at home which seem to be helping him control his vertigo. His repeat carotid Doppler study 6 months ago was okay Benign essential tremor that is progressive on Mysoline 100 mg in the morning and 150 mg at night We will continue his Mysoline to 100 mg in the morning and 150 mg at night New prescription given to patient today We had long discussion with patient about other options like deep brain stimulation or ultrasound therapy Information on deep brain stimulation given to the patient today 35 minutes spent with patient today discussing his prognosis, further treatment options and therapy He is advised to take a multivitamin and vitamin D. On a daily basis, remain mentally and physically active, exercise regularly Followup in 6 months time or earlier if needed and patient will call for refills when needed Signed By: Augustine Barros MD   
 July 12, 2019 This note will not be viewable in [address] If you have questions, please do not hesitate to call me.   I look forward to following Mr. Anna Davidson along with you. Sincerely, Doretha Gore MD

## 2020-01-20 ENCOUNTER — OFFICE VISIT (OUTPATIENT)
Dept: NEUROLOGY | Age: 70
End: 2020-01-20

## 2020-01-20 VITALS
OXYGEN SATURATION: 98 % | SYSTOLIC BLOOD PRESSURE: 118 MMHG | HEART RATE: 69 BPM | HEIGHT: 71 IN | DIASTOLIC BLOOD PRESSURE: 70 MMHG | BODY MASS INDEX: 32.76 KG/M2 | WEIGHT: 234 LBS | RESPIRATION RATE: 16 BRPM

## 2020-01-20 DIAGNOSIS — G44.229 CHRONIC TENSION-TYPE HEADACHE, NOT INTRACTABLE: ICD-10-CM

## 2020-01-20 DIAGNOSIS — I67.89 CEREBRAL MICROVASCULAR DISEASE: ICD-10-CM

## 2020-01-20 DIAGNOSIS — I65.23 STENOSIS OF BOTH INTERNAL CAROTID ARTERIES: Primary | ICD-10-CM

## 2020-01-20 DIAGNOSIS — R42 DIZZINESS AND GIDDINESS: ICD-10-CM

## 2020-01-20 DIAGNOSIS — G25.0 ESSENTIAL TREMOR: ICD-10-CM

## 2020-01-20 RX ORDER — AMANTADINE HYDROCHLORIDE 100 MG/1
100 CAPSULE, GELATIN COATED ORAL 2 TIMES DAILY
Qty: 100 CAP | Refills: 5 | Status: SHIPPED | OUTPATIENT
Start: 2020-01-20 | End: 2021-08-09 | Stop reason: SDUPTHER

## 2020-01-20 NOTE — PATIENT INSTRUCTIONS

## 2020-01-20 NOTE — LETTER
1/20/20 Patient: Krystle Hodge YOB: 1950 Date of Visit: 1/20/2020 Tierra Petit MD 
[id number] [address] [address] VIA Facsimile: [phone] Dear Tierra Petit MD, Thank you for referring Mr. Krystle Hodge to [facility] for evaluation. My notes for this consultation are attached. Consult Subjective:  
 
Krystle Hodge is a 81 y.o. right-handed  male seen for evaluation at the request of Dr. Tom Rollins of new problem with progressive tremors that are slowly worsening with time, despite having increased his Mysoline up to 100 mg in the morning and 250 mg in the evening at his last visit, and the patient is tolerating the medication well with only occasional lightheaded spell when he gets up quickly. It does interfere with his ability to sign his signature or write, and he is willing to try new medication, but is not willing to consider interventional therapy like deep brain stimulation or ultrasound therapy. We will check levels of his medications to make sure they are in the therapeutic range and not too high. He does find that wine significantly helps him reduce his tremor and he commonly will drink a glass of wine before he has to write and that seems to help quite a bit. He does have high blood pressure, but is very mild he just takes Cozaar for that, one consideration in the future might be to switch his Cozaar to a beta-blocker. At this time we will try him on amantadine since he has not tried that yet, and we gave him 100 mg to take once a day, and he will try that and advance to twice a day if he needs to. We also discussed deep brain stimulation and other non-medicinal therapeutic options like ultrasound therapy, and gave him information to read on DBS, and he will think about it.   He has had positional vertigo that seems to be better now after he got therapy and tried Antivert, and just does his exercises intermittently when the vertigo recurs. His carotid Doppler studies done 2 years ago were relatively unremarkable for any significant obstructive disease, but we will repeat that in 2 weeks. .   He has not had any hearing loss or tinnitus in his ears. He has had no new focal weakness or sensory loss, vision problems gait problems headaches, meningismus, or any other new neurological complaints. However he is also on 2 prostate medications that cause orthostasis, but he does have a rising PSA titer. He has no history of cancer, just benign prostatic hypertrophy. He's had no other new focal weakness, sensory loss, visual change, or other new neurological problems except as above or other neurological problems except as above. We discussed various options for his tremor including ultrasound and deep brain stimulation and gave him information on deep brain stimulation. He's had normal thyroid testing in the past. He occasionally has to use 2 hands to hold a glass of liquid, and sometimes has to drink a glass of wine to sign checks more appropriately. . Stress and tension and fatigue makes his tremors worse, and the medicine helps, and occasionally wine, but not much else. He's had no weakness no sensory loss no headaches no visual symptoms no gait or balance problems. He had no family history of tremors. He's had no other new neurological symptoms, and is now retired. He has no family history of tremor On complete review of systems and symptoms he has had no new medical problems complications or illnesses. He has a history of essential tremor, hypertension, hyperlipidemia. Past Medical History:  
Diagnosis Date  Enlarged prostate  Essential hypertension  Hyperlipemia  Sleep apnea Past Surgical History:  
Procedure Laterality Date [address] [place] [address] UNLISTED Family History Problem Relation Age of Onset  Cancer Mother breast  
 Dementia Father  Cancer Father   
     bladder Social History Tobacco Use  Smoking status: Never Smoker  Smokeless tobacco: Never Used Substance Use Topics  Alcohol use: Yes Comment: socially Current Outpatient Medications Medication Sig Dispense Refill  amantadine HCL (SYMMETREL) 100 mg capsule Take 1 Cap by mouth two (2) times a day. 100 Cap 5  
 losartan (COZAAR) 50 mg tablet daily. 0  
 primidone (MYSOLINE) 50 mg tablet take 2 tablets by mouth every morning 180 Tab 11  
 primidone (MYSOLINE) 250 mg tablet Take 1 Tab by mouth nightly. 90 Tab 4  
 finasteride (PROSCAR) 5 mg tablet 5 mg daily. 0  
 tamsulosin (FLOMAX) 0.4 mg capsule 0.4 mg daily. 0  
 simvastatin (ZOCOR) 40 mg tablet 20 mg nightly.  aspirin delayed-release 81 mg tablet Take  by mouth daily.  Multivit,Ca,Iron-FA-Lyco-Lut (CENTRAL-JERMAINE) -194-250 mg-mcg-mcg-mcg tab Take  by mouth.  Cholecalciferol, Vitamin D3, (VITAMIN D3) 1,000 unit cap Take 1,000 Units by mouth daily. No Known Allergies Review of Systems: A comprehensive review of systems was negative except for: Neurological: positive for tremor Vitals:  
 01/20/20 1404 BP: 118/70 Pulse: 69 Resp: 16 SpO2: 98% Weight: 234 lb (106.1 kg) Height: 5' 11\" (1.803 m) Objective: I 
 
 
NEUROLOGICAL EXAM: 
 
Appearance: The patient is well developed, well nourished, provides a coherent history and is in no acute distress. Mental Status: Oriented to time, place and person, and the president, cognitive function is normal, speech is fluent. Mood and affect appropriate. Cranial Nerves:   Intact visual fields. Fundi are benign, discs are flat. JACQUELINE, EOM's full, no nystagmus, no ptosis. Facial sensation is normal. Corneal reflexes are not tested. Facial movement is symmetric. Hearing is normal bilaterally.  Palate is midline with normal sternocleidomastoid and trapezius muscles are normal. Tongue is midline. Neck without meningismus or bruits Temporal arteries are not tender or enlarged Motor:  5/5 strength in upper and lower proximal and distal muscles. Normal bulk and tone. No fasciculations. Patient does move slowly because of his back pain at times. Rapid altering movement is slow but symmetric bilaterally Reflexes:   Deep tendon reflexes 2+/4 and symmetrical. 
No babinski or clonus present Patient has positive head hanging position vertigo when he sits up quickly, worse with head tilted to the right suggesting right vestibular dysfunction Hearing is mildly decreased only Sensory:   Normal to touch, pinprick and vibration and temperature. DSS is intact Gait:  Normal gait. Tremor: Moderate bilateral intention tremor, left side slightly worse than the right, and much worse with writing. Cerebellar:   Mildly abnormal Romberg and tandem cerebellar signs present And finger-nose-finger examination is positive for the prominent tremor. Neurovascular:  Normal heart sounds and regular rhythm, peripheral pulses decreased, and no carotid bruits. Assessment:  
 
 
 
Plan:  
 
Patient with worsening tremor,, already on 100 mg of amantadine in the morning and 250 mg at night, so we will try amantadine 100 mg once or twice a day and he will advance the dose as tolerated. He was advised of the side effect of the medication as far as confusion agitation, dryness of the mouth, other side effects and to call us immediately if the problem. Another option in the future might be to switch to a beta-blocker for his blood pressure, and off the Cozaar. Patient also given information on other nonmedicinal therapies including deep brain stimulation and ultrasound therapy, we gave him some information to read on deep brain stimulation. We offered to refer him to Dr. Aron Mcallister but he will read the information first and then call us back. Patient with positional vertigo, probably vestibular dysfunction, we gave him Antivert to use as needed and recommend and he will continue his vestibular exercises at home which seem to be helping him control his vertigo. He will repeat his Doppler in 2 weeks, since his been 2 years since his last study. Patient will have his levels checked today, and refills for his medication given to the patient today. We had long discussion with patient about other options like deep brain stimulation or ultrasound therapy Information on deep brain stimulation given to the patient today 35 minutes spent with patient today discussing his prognosis, further treatment options and therapy He is advised to take a multivitamin and vitamin D. On a daily basis, remain mentally and physically active, exercise regularly Followup in 6 months time or earlier if needed and patient will call for refills when needed Signed By: Fito Rodríguez MD   
 January 20, 2020 This note will not be viewable in [address] If you have questions, please do not hesitate to call me. I look forward to following your patient along with you. Sincerely, Fito Rodríguez MD

## 2020-01-21 NOTE — PROGRESS NOTES
Consult    Subjective:     Krystle Hodge is a 81 y.o. right-handed  male seen for evaluation at the request of Dr. Tom Rollins of new problem with progressive tremors that are slowly worsening with time, despite having increased his Mysoline up to 100 mg in the morning and 250 mg in the evening at his last visit, and the patient is tolerating the medication well with only occasional lightheaded spell when he gets up quickly. It does interfere with his ability to sign his signature or write, and he is willing to try new medication, but is not willing to consider interventional therapy like deep brain stimulation or ultrasound therapy. We will check levels of his medications to make sure they are in the therapeutic range and not too high. He does find that wine significantly helps him reduce his tremor and he commonly will drink a glass of wine before he has to write and that seems to help quite a bit. He does have high blood pressure, but is very mild he just takes Cozaar for that, one consideration in the future might be to switch his Cozaar to a beta-blocker. At this time we will try him on amantadine since he has not tried that yet, and we gave him 100 mg to take once a day, and he will try that and advance to twice a day if he needs to. We also discussed deep brain stimulation and other non-medicinal therapeutic options like ultrasound therapy, and gave him information to read on DBS, and he will think about it. He has had positional vertigo that seems to be better now after he got therapy and tried Antivert, and just does his exercises intermittently when the vertigo recurs. His carotid Doppler studies done 2 years ago were relatively unremarkable for any significant obstructive disease, but we will repeat that in 2 weeks. .   He has not had any hearing loss or tinnitus in his ears.   He has had no new focal weakness or sensory loss, vision problems gait problems headaches, meningismus, or any other new neurological complaints. However he is also on 2 prostate medications that cause orthostasis, but he does have a rising PSA titer. He has no history of cancer, just benign prostatic hypertrophy. He's had no other new focal weakness, sensory loss, visual change, or other new neurological problems except as above or other neurological problems except as above. We discussed various options for his tremor including ultrasound and deep brain stimulation and gave him information on deep brain stimulation. He's had normal thyroid testing in the past. He occasionally has to use 2 hands to hold a glass of liquid, and sometimes has to drink a glass of wine to sign checks more appropriately. . Stress and tension and fatigue makes his tremors worse, and the medicine helps, and occasionally wine, but not much else. He's had no weakness no sensory loss no headaches no visual symptoms no gait or balance problems. He had no family history of tremors. He's had no other new neurological symptoms, and is now retired. He has no family history of tremor    On complete review of systems and symptoms he has had no new medical problems complications or illnesses. He has a history of essential tremor, hypertension, hyperlipidemia. Past Medical History:   Diagnosis Date    Enlarged prostate     Essential hypertension     Hyperlipemia     Sleep apnea       Past Surgical History:   Procedure Laterality Date    ABDOMEN SURGERY PROC UNLISTED       Family History   Problem Relation Age of Onset   24 [facility] Cancer Mother         breast    Dementia Father     Cancer Father         bladder      Social History     Tobacco Use    Smoking status: Never Smoker    Smokeless tobacco: Never Used   Substance Use Topics    Alcohol use: Yes     Comment: socially       Current Outpatient Medications   Medication Sig Dispense Refill    amantadine HCL (SYMMETREL) 100 mg capsule Take 1 Cap by mouth two (2) times a day.  100 Cap 5    losartan (COZAAR) 50 mg tablet daily. 0    primidone (MYSOLINE) 50 mg tablet take 2 tablets by mouth every morning 180 Tab 11    primidone (MYSOLINE) 250 mg tablet Take 1 Tab by mouth nightly. 90 Tab 4    finasteride (PROSCAR) 5 mg tablet 5 mg daily. 0    tamsulosin (FLOMAX) 0.4 mg capsule 0.4 mg daily. 0    simvastatin (ZOCOR) 40 mg tablet 20 mg nightly.  aspirin delayed-release 81 mg tablet Take  by mouth daily.  Multivit,Ca,Iron-FA-Lyco-Lut (CENTRAL-JERMAINE) -520-250 mg-mcg-mcg-mcg tab Take  by mouth.  Cholecalciferol, Vitamin D3, (VITAMIN D3) 1,000 unit cap Take 1,000 Units by mouth daily. No Known Allergies     Review of Systems:  A comprehensive review of systems was negative except for: Neurological: positive for tremor   Vitals:    01/20/20 1404   BP: 118/70   Pulse: 69   Resp: 16   SpO2: 98%   Weight: 234 lb (106.1 kg)   Height: 5' 11\" (1.803 m)     Objective:     I      NEUROLOGICAL EXAM:    Appearance: The patient is well developed, well nourished, provides a coherent history and is in no acute distress. Mental Status: Oriented to time, place and person, and the president, cognitive function is normal, speech is fluent. Mood and affect appropriate. Cranial Nerves:   Intact visual fields. Fundi are benign, discs are flat. JACQUELINE, EOM's full, no nystagmus, no ptosis. Facial sensation is normal. Corneal reflexes are not tested. Facial movement is symmetric. Hearing is normal bilaterally. Palate is midline with normal sternocleidomastoid and trapezius muscles are normal. Tongue is midline. Neck without meningismus or bruits  Temporal arteries are not tender or enlarged    Motor:  5/5 strength in upper and lower proximal and distal muscles. Normal bulk and tone. No fasciculations. Patient does move slowly because of his back pain at times.   Rapid altering movement is slow but symmetric bilaterally   Reflexes:   Deep tendon reflexes 2+/4 and symmetrical.  No babinski or clonus present  Patient has positive head hanging position vertigo when he sits up quickly, worse with head tilted to the right suggesting right vestibular dysfunction  Hearing is mildly decreased only   Sensory:   Normal to touch, pinprick and vibration and temperature. DSS is intact   Gait:  Normal gait. Tremor: Moderate bilateral intention tremor, left side slightly worse than the right, and much worse with writing. Cerebellar:   Mildly abnormal Romberg and tandem cerebellar signs present  And finger-nose-finger examination is positive for the prominent tremor. Neurovascular:  Normal heart sounds and regular rhythm, peripheral pulses decreased, and no carotid bruits. Assessment:         Plan:     Patient with worsening tremor,, already on 100 mg of amantadine in the morning and 250 mg at night, so we will try amantadine 100 mg once or twice a day and he will advance the dose as tolerated. He was advised of the side effect of the medication as far as confusion agitation, dryness of the mouth, other side effects and to call us immediately if the problem. Another option in the future might be to switch to a beta-blocker for his blood pressure, and off the Cozaar. Patient also given information on other nonmedicinal therapies including deep brain stimulation and ultrasound therapy, we gave him some information to read on deep brain stimulation. We offered to refer him to Dr. Bhargav Burroughs but he will read the information first and then call us back. Patient with positional vertigo, probably vestibular dysfunction, we gave him Antivert to use as needed and recommend and he will continue his vestibular exercises at home which seem to be helping him control his vertigo. He will repeat his Doppler in 2 weeks, since his been 2 years since his last study. Patient will have his levels checked today, and refills for his medication given to the patient today.   We had long discussion with patient about other options like deep brain stimulation or ultrasound therapy  Information on deep brain stimulation given to the patient today  35 minutes spent with patient today discussing his prognosis, further treatment options and therapy  He is advised to take a multivitamin and vitamin D. On a daily basis, remain mentally and physically active, exercise regularly  Followup in 6 months time or earlier if needed and patient will call for refills when needed    Signed By: Jus Frank MD     January 20, 2020       This note will not be viewable in [address]

## 2020-01-22 LAB
PHENOBARB SERPL-MCNC: 7 UG/ML (ref 15–40)
PHENOBARB SERPL-MCNC: 8 UG/ML (ref 15–40)
PRIMIDONE SERPL-MCNC: 5.5 UG/ML (ref 5–12)

## 2020-04-08 ENCOUNTER — TELEPHONE (OUTPATIENT)
Dept: NEUROLOGY | Age: 70
End: 2020-04-08

## 2020-04-08 DIAGNOSIS — R42 DIZZINESS AND GIDDINESS: ICD-10-CM

## 2020-04-08 DIAGNOSIS — I65.23 STENOSIS OF BOTH INTERNAL CAROTID ARTERIES: ICD-10-CM

## 2020-04-08 DIAGNOSIS — G25.0 BENIGN ESSENTIAL TREMOR SYNDROME: ICD-10-CM

## 2020-04-08 DIAGNOSIS — G25.0 ESSENTIAL TREMOR: ICD-10-CM

## 2020-04-08 RX ORDER — PRIMIDONE 50 MG/1
150 TABLET ORAL DAILY
Qty: 270 TAB | Refills: 11 | Status: SHIPPED | OUTPATIENT
Start: 2020-04-08 | End: 2020-04-08 | Stop reason: SDUPTHER

## 2020-04-08 RX ORDER — PROPRANOLOL HYDROCHLORIDE 10 MG/1
10 TABLET ORAL 2 TIMES DAILY
Qty: 100 TAB | Refills: 11 | Status: SHIPPED | OUTPATIENT
Start: 2020-04-08 | End: 2021-05-11 | Stop reason: SDUPTHER

## 2020-04-08 RX ORDER — PRIMIDONE 50 MG/1
150 TABLET ORAL DAILY
Qty: 270 TAB | Refills: 11 | Status: SHIPPED | OUTPATIENT
Start: 2020-04-08 | End: 2021-06-29 | Stop reason: SDUPTHER

## 2020-04-08 NOTE — TELEPHONE ENCOUNTER
----- Message from Ever Serrano sent at 4/8/2020 10:06 AM EDT -----  Regarding: Dr. Vineet Bedoya  General Message/Vendor Calls    Caller's first and last name: Kayla ( [facility])      Reason for call: Requesting a call back in regards to instructions for Rx Primadone 50mg      Callback required yes/no and why:Yes      Best contact number(s):[phone]      Details to clarify the request:      Ever Serrano

## 2020-04-08 NOTE — TELEPHONE ENCOUNTER
Primidone was increased to 150mg in the morning and 250mg in the evening. The script sent in for 150mg pills with 3 daily and 2 in the morning.     Please advise of directions and send in correctly

## 2020-08-21 ENCOUNTER — OFFICE VISIT (OUTPATIENT)
Dept: NEUROLOGY | Age: 70
End: 2020-08-21
Payer: MEDICARE

## 2020-08-21 VITALS
DIASTOLIC BLOOD PRESSURE: 70 MMHG | BODY MASS INDEX: 30.8 KG/M2 | HEART RATE: 70 BPM | WEIGHT: 220 LBS | SYSTOLIC BLOOD PRESSURE: 126 MMHG | RESPIRATION RATE: 12 BRPM | HEIGHT: 71 IN | OXYGEN SATURATION: 98 %

## 2020-08-21 DIAGNOSIS — I65.23 STENOSIS OF BOTH INTERNAL CAROTID ARTERIES: Primary | ICD-10-CM

## 2020-08-21 DIAGNOSIS — G25.0 ESSENTIAL TREMOR: ICD-10-CM

## 2020-08-21 DIAGNOSIS — R42 DIZZINESS AND GIDDINESS: ICD-10-CM

## 2020-08-21 DIAGNOSIS — I67.89 CEREBRAL MICROVASCULAR DISEASE: ICD-10-CM

## 2020-08-21 DIAGNOSIS — G44.229 CHRONIC TENSION-TYPE HEADACHE, NOT INTRACTABLE: ICD-10-CM

## 2020-08-21 PROCEDURE — 99213 OFFICE O/P EST LOW 20 MIN: CPT | Performed by: PSYCHIATRY & NEUROLOGY

## 2020-08-21 PROCEDURE — 3017F COLORECTAL CA SCREEN DOC REV: CPT | Performed by: PSYCHIATRY & NEUROLOGY

## 2020-08-21 PROCEDURE — G8417 CALC BMI ABV UP PARAM F/U: HCPCS | Performed by: PSYCHIATRY & NEUROLOGY

## 2020-08-21 PROCEDURE — G8536 NO DOC ELDER MAL SCRN: HCPCS | Performed by: PSYCHIATRY & NEUROLOGY

## 2020-08-21 PROCEDURE — G8754 DIAS BP LESS 90: HCPCS | Performed by: PSYCHIATRY & NEUROLOGY

## 2020-08-21 PROCEDURE — G8510 SCR DEP NEG, NO PLAN REQD: HCPCS | Performed by: PSYCHIATRY & NEUROLOGY

## 2020-08-21 PROCEDURE — G8752 SYS BP LESS 140: HCPCS | Performed by: PSYCHIATRY & NEUROLOGY

## 2020-08-21 PROCEDURE — 1101F PT FALLS ASSESS-DOCD LE1/YR: CPT | Performed by: PSYCHIATRY & NEUROLOGY

## 2020-08-21 PROCEDURE — G8427 DOCREV CUR MEDS BY ELIG CLIN: HCPCS | Performed by: PSYCHIATRY & NEUROLOGY

## 2020-08-21 RX ORDER — PRIMIDONE 250 MG/1
250 TABLET ORAL
Qty: 90 TAB | Refills: 4 | Status: SHIPPED | OUTPATIENT
Start: 2020-08-21 | End: 2021-08-09 | Stop reason: SDUPTHER

## 2020-08-21 NOTE — PROGRESS NOTES
Consult    Subjective:     Edwige Patel is a 81 y.o. right-handed  male seen for evaluation at the request of Dr. Lotus Meza of problem with progressive tremors that were slowly worsening with time, but he says the tremor seems stable this time, and he feels he is doing well and does not need to increase his medication, and is tolerating the medication well without any side effect, and his new treatment now is 150 mg of Mysoline in the morning, and 250 mg at night, and Inderal 10 mg twice a day and amantadine 100 mg twice a day also. We will continue this medication at current dose and refill sent in for patient says he is stable and not having any side effect on the medication. He does find that wine significantly helps him reduce his tremor and he commonly will drink a glass of wine before he has to write and that seems to help quite a bit. We also discussed deep brain stimulation and other non-medicinal therapeutic options like ultrasound therapy, and gave him information to read on DBS, and he will think about it, but he is not anxious for surgery and does not think he needs it now. .  He has had positional vertigo that seems to be better now after he got therapy and tried Antivert, and just does his exercises intermittently when the vertigo recurs. His carotid Doppler studies done 6 months ago were relatively unremarkable for any significant obstructive disease, but we will repeat that in another 12 months. .   He has not had any hearing loss or tinnitus in his ears. He has had no new focal weakness or sensory loss, vision problems gait problems headaches, meningismus, or any other new neurological complaints. However he is also on 2 prostate medications that cause orthostasis, but he does have a rising PSA titer. He has no history of cancer, just benign prostatic hypertrophy.  He's had no other new focal weakness, sensory loss, visual change, or other new neurological problems except as above or other neurological problems except as above. We discussed various options for his tremor including ultrasound and deep brain stimulation and gave him information on deep brain stimulation. He's had normal thyroid testing in the past. He occasionally has to use 2 hands to hold a glass of liquid, and sometimes has to drink a glass of wine to sign checks more appropriately. . Stress and tension and fatigue makes his tremors worse, and the medicine helps, and occasionally wine, but not much else. He's had no weakness no sensory loss no headaches no visual symptoms no gait or balance problems. He had no family history of tremors. He's had no other new neurological symptoms, and is now retired. He has no family history of tremor    On complete review of systems and symptoms he has had no new medical problems complications or illnesses. He has a history of essential tremor, hypertension, hyperlipidemia. Past Medical History:   Diagnosis Date    Enlarged prostate     Essential hypertension     Hyperlipemia     Sleep apnea       Past Surgical History:   Procedure Laterality Date    ABDOMEN SURGERY PROC UNLISTED       Family History   Problem Relation Age of Onset   [facility] Cancer Mother         breast    Dementia Father     Cancer Father         bladder      Social History     Tobacco Use    Smoking status: Never Smoker    Smokeless tobacco: Never Used   Substance Use Topics    Alcohol use: Yes     Comment: socially       Current Outpatient Medications   Medication Sig Dispense Refill    primidone (Mysoline) 250 mg tablet Take 1 Tab by mouth nightly. 90 Tab 4    propranoloL (INDERAL) 10 mg tablet Take 1 Tab by mouth two (2) times a day. 100 Tab 11    primidone (MYSOLINE) 50 mg tablet Take 3 Tabs by mouth daily. 270 Tab 11    amantadine HCL (SYMMETREL) 100 mg capsule Take 1 Cap by mouth two (2) times a day. 100 Cap 5    losartan (COZAAR) 50 mg tablet daily. 0    finasteride (PROSCAR) 5 mg tablet 5 mg daily.   0  tamsulosin (FLOMAX) 0.4 mg capsule 0.4 mg daily. 0    simvastatin (ZOCOR) 40 mg tablet 20 mg nightly.  aspirin delayed-release 81 mg tablet Take  by mouth daily.  Multivit,Ca,Iron-FA-Lyco-Lut (CENTRAL-JERMAINE) -912-250 mg-mcg-mcg-mcg tab Take  by mouth.  Cholecalciferol, Vitamin D3, (VITAMIN D3) 1,000 unit cap Take 1,000 Units by mouth daily. No Known Allergies     Review of Systems:  A comprehensive review of systems was negative except for: Neurological: positive for tremor   Vitals:    08/21/20 1023   BP: 126/70   Pulse: 70   Resp: 12   SpO2: 98%   Weight: 220 lb (99.8 kg)   Height: 5' 11\" (1.803 m)     Objective:     I      NEUROLOGICAL EXAM:    Appearance: The patient is well developed, well nourished, provides a coherent history and is in no acute distress. Mental Status: Oriented to time, place and person, and the president, cognitive function is normal, speech is fluent. Mood and affect appropriate. Cranial Nerves:   Intact visual fields. Fundi are benign, discs are flat. JACQUELINE, EOM's full, no nystagmus, no ptosis. Facial sensation is normal. Corneal reflexes are not tested. Facial movement is symmetric. Hearing is normal bilaterally. Palate is midline with normal sternocleidomastoid and trapezius muscles are normal. Tongue is midline. Neck without meningismus or bruits  Temporal arteries are not tender or enlarged    Motor:  5/5 strength in upper and lower proximal and distal muscles. Normal bulk and tone. No fasciculations. Patient does move slowly because of his back pain at times.   Rapid altering movement is slow but symmetric bilaterally   Reflexes:   Deep tendon reflexes 2+/4 and symmetrical.  No babinski or clonus present  Patient has positive head hanging position vertigo when he sits up quickly, worse with head tilted to the right suggesting right vestibular dysfunction  Hearing is mildly decreased only   Sensory:   Normal to touch, pinprick and vibration and temperature. DSS is intact   Gait:  Normal gait. Tremor: Moderate bilateral intention tremor, left side slightly worse than the right, and much worse with writing. Cerebellar:   Mildly abnormal Romberg and tandem cerebellar signs present  And finger-nose-finger examination is positive for the prominent tremor. Neurovascular:  Normal heart sounds and regular rhythm, peripheral pulses decreased, and no carotid bruits. Assessment:         Plan:     Patient with moderate severe essential tremor on 150 mg of Mysoline in the morning and 250 mg at night, and amantadine 100 mg once or twice a day and Inderal 10 mg twice a day and these were all renewed with patient today. He was advised of the side effect of the medication as far as confusion agitation, dryness of the mouth, hypotension dizziness sedation and nausea and other side effects and to call us immediately if the problem. Another option in the future might be to increase beta-blocker for his blood pressure, and off the Cozaar. Patient also given information on other nonmedicinal therapies including deep brain stimulation and ultrasound therapy, we gave him some information to read on deep brain stimulation. We offered to refer him to Dr. Rosa Bazzi but he will read the information first and then call us back. Patient with positional vertigo, probably vestibular dysfunction, we gave him Antivert to use as needed and recommend and he will continue his vestibular exercises at home which seem to be helping him control his vertigo. Carotid Dopplers 6 months ago were unremarkable and they will be repeated in 12 months time at his follow-up.   We had long discussion with patient about other options like deep brain stimulation or ultrasound therapy  Information on deep brain stimulation given to the patient today  35 minutes spent with patient today discussing his prognosis, further treatment options and therapy  He is advised to take a multivitamin and vitamin D. On a daily basis, remain mentally and physically active, exercise regularly  Followup in 12 months time or earlier if needed and patient will call for refills when needed    Signed By: Iris Eckert MD     August 21, 2020       This note will not be viewable in [address]

## 2020-08-21 NOTE — PATIENT INSTRUCTIONS
Office Policies 
 
o Phone calls/patient messages: Please allow up to 24 hours for someone in the office to contact you about your call or message. Be mindful your provider may be out of the office or your message may require further review. We encourage you to use [facility] for your messages as this is a faster, more efficient way to communicate with our office 
 
o Medication Refills: 
Prescription medications require up to 48 business hours to process. We encourage you to use [facility] for your refills. For controlled medications: Please allow up to 72 business hours to process. Certain medications may require you to  a written prescription at our office. NO narcotic/controlled medications will be prescribed after 4pm Monday through Friday or on weekends 
 
o Form/Paperwork Completion: We ask that you allow 7-14 business days. You may also download your forms to [facility] to have your doctor print off.

## 2020-08-21 NOTE — LETTER
8/21/20 Patient: Ji Serrato YOB: 1950 Date of Visit: 8/21/2020 Jean Meyers MD 
[id number] [address] [address] VIA Facsimile: [phone] Dear Jean Meyers MD, Thank you for referring Mr. Ji Serrato to [facility] for evaluation. My notes for this consultation are attached. Consult Subjective:  
 
Ji Serrato is a 81 y.o. right-handed  male seen for evaluation at the request of Dr. Nuno Quiroz of problem with progressive tremors that were slowly worsening with time, but he says the tremor seems stable this time, and he feels he is doing well and does not need to increase his medication, and is tolerating the medication well without any side effect, and his new treatment now is 150 mg of Mysoline in the morning, and 250 mg at night, and Inderal 10 mg twice a day and amantadine 100 mg twice a day also. We will continue this medication at current dose and refill sent in for patient says he is stable and not having any side effect on the medication. He does find that wine significantly helps him reduce his tremor and he commonly will drink a glass of wine before he has to write and that seems to help quite a bit. We also discussed deep brain stimulation and other non-medicinal therapeutic options like ultrasound therapy, and gave him information to read on DBS, and he will think about it, but he is not anxious for surgery and does not think he needs it now. .  He has had positional vertigo that seems to be better now after he got therapy and tried Antivert, and just does his exercises intermittently when the vertigo recurs. His carotid Doppler studies done 6 months ago were relatively unremarkable for any significant obstructive disease, but we will repeat that in another 12 months. .   He has not had any hearing loss or tinnitus in his ears.   He has had no new focal weakness or sensory loss, vision problems gait problems headaches, meningismus, or any other new neurological complaints. However he is also on 2 prostate medications that cause orthostasis, but he does have a rising PSA titer. He has no history of cancer, just benign prostatic hypertrophy. He's had no other new focal weakness, sensory loss, visual change, or other new neurological problems except as above or other neurological problems except as above. We discussed various options for his tremor including ultrasound and deep brain stimulation and gave him information on deep brain stimulation. He's had normal thyroid testing in the past. He occasionally has to use 2 hands to hold a glass of liquid, and sometimes has to drink a glass of wine to sign checks more appropriately. . Stress and tension and fatigue makes his tremors worse, and the medicine helps, and occasionally wine, but not much else. He's had no weakness no sensory loss no headaches no visual symptoms no gait or balance problems. He had no family history of tremors. He's had no other new neurological symptoms, and is now retired. He has no family history of tremor On complete review of systems and symptoms he has had no new medical problems complications or illnesses. He has a history of essential tremor, hypertension, hyperlipidemia. Past Medical History:  
Diagnosis Date  Enlarged prostate  Essential hypertension  Hyperlipemia  Sleep apnea Past Surgical History:  
Procedure Laterality Date [address] [place] UNLISTED Family History Problem Relation Age of Onset  Cancer Mother   
     breast  
 Dementia Father  Cancer Father   
     bladder Social History Tobacco Use  Smoking status: Never Smoker  Smokeless tobacco: Never Used Substance Use Topics  Alcohol use: Yes Comment: socially Current Outpatient Medications Medication Sig Dispense Refill  primidone (Mysoline) 250 mg tablet Take 1 Tab by mouth nightly. 90 Tab 4  propranoloL (INDERAL) 10 mg tablet Take 1 Tab by mouth two (2) times a day. 100 Tab 11  
 primidone (MYSOLINE) 50 mg tablet Take 3 Tabs by mouth daily. 270 Tab 11  
 amantadine HCL (SYMMETREL) 100 mg capsule Take 1 Cap by mouth two (2) times a day. 100 Cap 5  
 losartan (COZAAR) 50 mg tablet daily. 0  
 finasteride (PROSCAR) 5 mg tablet 5 mg daily. 0  
 tamsulosin (FLOMAX) 0.4 mg capsule 0.4 mg daily. 0  
 simvastatin (ZOCOR) 40 mg tablet 20 mg nightly.  aspirin delayed-release 81 mg tablet Take  by mouth daily.  Multivit,Ca,Iron-FA-Lyco-Lut (CENTRAL-JERMAINE) -747-250 mg-mcg-mcg-mcg tab Take  by mouth.  Cholecalciferol, Vitamin D3, (VITAMIN D3) 1,000 unit cap Take 1,000 Units by mouth daily. No Known Allergies Review of Systems: A comprehensive review of systems was negative except for: Neurological: positive for tremor Vitals:  
 08/21/20 1023 BP: 126/70 Pulse: 70 Resp: 12 SpO2: 98% Weight: 220 lb (99.8 kg) Height: 5' 11\" (1.803 m) Objective: I 
 
 
NEUROLOGICAL EXAM: 
 
Appearance: The patient is well developed, well nourished, provides a coherent history and is in no acute distress. Mental Status: Oriented to time, place and person, and the president, cognitive function is normal, speech is fluent. Mood and affect appropriate. Cranial Nerves:   Intact visual fields. Fundi are benign, discs are flat. JACQUELINE, EOM's full, no nystagmus, no ptosis. Facial sensation is normal. Corneal reflexes are not tested. Facial movement is symmetric. Hearing is normal bilaterally. Palate is midline with normal sternocleidomastoid and trapezius muscles are normal. Tongue is midline. Neck without meningismus or bruits Temporal arteries are not tender or enlarged Motor:  5/5 strength in upper and lower proximal and distal muscles. Normal bulk and tone. No fasciculations. Patient does move slowly because of his back pain at times. Rapid altering movement is slow but symmetric bilaterally Reflexes:   Deep tendon reflexes 2+/4 and symmetrical. 
No babinski or clonus present Patient has positive head hanging position vertigo when he sits up quickly, worse with head tilted to the right suggesting right vestibular dysfunction Hearing is mildly decreased only Sensory:   Normal to touch, pinprick and vibration and temperature. DSS is intact Gait:  Normal gait. Tremor: Moderate bilateral intention tremor, left side slightly worse than the right, and much worse with writing. Cerebellar:   Mildly abnormal Romberg and tandem cerebellar signs present And finger-nose-finger examination is positive for the prominent tremor. Neurovascular:  Normal heart sounds and regular rhythm, peripheral pulses decreased, and no carotid bruits. Assessment:  
 
 
 
Plan:  
 
Patient with moderate severe essential tremor on 150 mg of Mysoline in the morning and 250 mg at night, and amantadine 100 mg once or twice a day and Inderal 10 mg twice a day and these were all renewed with patient today. He was advised of the side effect of the medication as far as confusion agitation, dryness of the mouth, hypotension dizziness sedation and nausea and other side effects and to call us immediately if the problem. Another option in the future might be to increase beta-blocker for his blood pressure, and off the Cozaar. Patient also given information on other nonmedicinal therapies including deep brain stimulation and ultrasound therapy, we gave him some information to read on deep brain stimulation. We offered to refer him to Dr. Matt Suarez but he will read the information first and then call us back.  
Patient with positional vertigo, probably vestibular dysfunction, we gave him Antivert to use as needed and recommend and he will continue his vestibular exercises at home which seem to be helping him control his vertigo. Carotid Dopplers 6 months ago were unremarkable and they will be repeated in 12 months time at his follow-up. We had long discussion with patient about other options like deep brain stimulation or ultrasound therapy Information on deep brain stimulation given to the patient today 35 minutes spent with patient today discussing his prognosis, further treatment options and therapy He is advised to take a multivitamin and vitamin D. On a daily basis, remain mentally and physically active, exercise regularly Followup in 12 months time or earlier if needed and patient will call for refills when needed Signed By: Lord Vladimir MD   
 August 21, 2020 This note will not be viewable in [address] If you have questions, please do not hesitate to call me. I look forward to following your patient along with you. Sincerely, Lord Vladimir MD

## 2021-05-11 DIAGNOSIS — G25.0 BENIGN ESSENTIAL TREMOR SYNDROME: ICD-10-CM

## 2021-05-11 DIAGNOSIS — R42 DIZZINESS AND GIDDINESS: ICD-10-CM

## 2021-05-11 DIAGNOSIS — G25.0 ESSENTIAL TREMOR: ICD-10-CM

## 2021-05-11 DIAGNOSIS — I65.23 STENOSIS OF BOTH INTERNAL CAROTID ARTERIES: ICD-10-CM

## 2021-05-11 RX ORDER — PROPRANOLOL HYDROCHLORIDE 10 MG/1
10 TABLET ORAL 2 TIMES DAILY
Qty: 100 TAB | Refills: 11 | Status: SHIPPED | OUTPATIENT
Start: 2021-05-11 | End: 2022-06-16 | Stop reason: SDUPTHER

## 2021-05-11 NOTE — TELEPHONE ENCOUNTER
Future Appointments   Date Time Provider Christine Kay   8/9/2021 10:20 AM Rush Mercado MD [facility]                         Last Appointment My Department:  8/21/2020    Please review and send in refill below if warranted. Requested Prescriptions     Pending Prescriptions Disp Refills    propranoloL (INDERAL) 10 mg tablet 100 Tab 11     Sig: Take 1 Tab by mouth two (2) times a day.

## 2021-06-29 DIAGNOSIS — G25.0 BENIGN ESSENTIAL TREMOR SYNDROME: ICD-10-CM

## 2021-06-29 DIAGNOSIS — R42 DIZZINESS AND GIDDINESS: ICD-10-CM

## 2021-06-29 DIAGNOSIS — G25.0 ESSENTIAL TREMOR: ICD-10-CM

## 2021-06-29 DIAGNOSIS — I65.23 STENOSIS OF BOTH INTERNAL CAROTID ARTERIES: ICD-10-CM

## 2021-06-29 RX ORDER — PRIMIDONE 50 MG/1
150 TABLET ORAL DAILY
Qty: 270 TABLET | Refills: 4 | Status: SHIPPED | OUTPATIENT
Start: 2021-06-29 | End: 2022-08-10 | Stop reason: SDUPTHER

## 2021-06-29 NOTE — TELEPHONE ENCOUNTER
Future Appointments   Date Time Provider Christine Kay   8/9/2021 10:20 AM Jennifer Madera MD [facility]                         Last Appointment My Department:  8/21/2020    Please review and send in refill below if warranted. Requested Prescriptions     Pending Prescriptions Disp Refills    primidone (MYSOLINE) 50 mg tablet 270 Tablet 4     Sig: Take 3 Tablets by mouth daily.

## 2021-08-09 ENCOUNTER — OFFICE VISIT (OUTPATIENT)
Dept: NEUROLOGY | Age: 71
End: 2021-08-09
Payer: MEDICARE

## 2021-08-09 VITALS
HEIGHT: 71 IN | SYSTOLIC BLOOD PRESSURE: 125 MMHG | WEIGHT: 226 LBS | DIASTOLIC BLOOD PRESSURE: 73 MMHG | HEART RATE: 66 BPM | RESPIRATION RATE: 12 BRPM | OXYGEN SATURATION: 96 % | BODY MASS INDEX: 31.64 KG/M2

## 2021-08-09 DIAGNOSIS — R42 DIZZINESS AND GIDDINESS: ICD-10-CM

## 2021-08-09 DIAGNOSIS — G25.0 ESSENTIAL TREMOR: ICD-10-CM

## 2021-08-09 DIAGNOSIS — I67.89 CEREBRAL MICROVASCULAR DISEASE: ICD-10-CM

## 2021-08-09 DIAGNOSIS — G44.229 CHRONIC TENSION-TYPE HEADACHE, NOT INTRACTABLE: ICD-10-CM

## 2021-08-09 DIAGNOSIS — I65.23 STENOSIS OF BOTH INTERNAL CAROTID ARTERIES: Primary | ICD-10-CM

## 2021-08-09 PROCEDURE — G8752 SYS BP LESS 140: HCPCS | Performed by: PSYCHIATRY & NEUROLOGY

## 2021-08-09 PROCEDURE — 3017F COLORECTAL CA SCREEN DOC REV: CPT | Performed by: PSYCHIATRY & NEUROLOGY

## 2021-08-09 PROCEDURE — G8417 CALC BMI ABV UP PARAM F/U: HCPCS | Performed by: PSYCHIATRY & NEUROLOGY

## 2021-08-09 PROCEDURE — 1101F PT FALLS ASSESS-DOCD LE1/YR: CPT | Performed by: PSYCHIATRY & NEUROLOGY

## 2021-08-09 PROCEDURE — 99214 OFFICE O/P EST MOD 30 MIN: CPT | Performed by: PSYCHIATRY & NEUROLOGY

## 2021-08-09 PROCEDURE — G8427 DOCREV CUR MEDS BY ELIG CLIN: HCPCS | Performed by: PSYCHIATRY & NEUROLOGY

## 2021-08-09 PROCEDURE — G8536 NO DOC ELDER MAL SCRN: HCPCS | Performed by: PSYCHIATRY & NEUROLOGY

## 2021-08-09 PROCEDURE — G8432 DEP SCR NOT DOC, RNG: HCPCS | Performed by: PSYCHIATRY & NEUROLOGY

## 2021-08-09 PROCEDURE — G8754 DIAS BP LESS 90: HCPCS | Performed by: PSYCHIATRY & NEUROLOGY

## 2021-08-09 RX ORDER — PRIMIDONE 250 MG/1
250 TABLET ORAL
Qty: 90 TABLET | Refills: 4 | Status: SHIPPED | OUTPATIENT
Start: 2021-08-09 | End: 2022-08-10 | Stop reason: SDUPTHER

## 2021-08-09 RX ORDER — AMANTADINE HYDROCHLORIDE 100 MG/1
100 CAPSULE, GELATIN COATED ORAL 2 TIMES DAILY
Qty: 100 CAPSULE | Refills: 11 | Status: SHIPPED | OUTPATIENT
Start: 2021-08-09 | End: 2022-08-10 | Stop reason: SDUPTHER

## 2021-08-09 NOTE — LETTER
8/9/2021    Patient: Matt Mirza   YOB: 1950   Date of Visit: 8/9/2021     Ronan Flores, [address]  [address] [address]  Via Fax: [phone]    Dear Ronan Flores MD,      Thank you for referring Mr. Matt Mirza to [address] [place] [address] for evaluation. My notes for this consultation are attached. Consult    Subjective:     Matt Mirza is a 78 y.o. right-handed  male seen for evaluation at the request of Dr. Adrienne Carpio of problem with progressive tremors that were slowly worsening with time, and a need for repeat carotid Doppler study since has not had any in close to 2 years. Patient says the tremor seems stable this time, and he feels he is doing well and does not need to increase his medication, and is tolerating the medication well without any side effect, and his new treatment now is 150 mg of Mysoline in the morning, and 250 mg at night, and Inderal 10 mg twice a day and amantadine 100 mg twice a day also. We will continue this medication at current dose and refill sent in for patient says he is stable and not having any side effect on the medication. He does find that wine significantly helps him reduce his tremor and he commonly will drink a glass of wine before he has to write and that seems to help quite a bit. We also discussed deep brain stimulation and other non-medicinal therapeutic options like ultrasound therapy, and gave him information to read on DBS, and he will think about it, but he is not anxious for surgery and does not think he needs it now. .  He has had positional vertigo that seems to be better now after he got therapy and tried Antivert, and just does his exercises intermittently when the vertigo recurs. He had the Dopplers for dizziness. He has not had any hearing loss or tinnitus in his ears.   He has had no new focal weakness or sensory loss, vision problems gait problems headaches, meningismus, or any other new neurological complaints. However he is also on 2 prostate medications that cause orthostasis, but he does have a rising PSA titer. He has no history of cancer, just benign prostatic hypertrophy. He's had no other new focal weakness, sensory loss, visual change, or other new neurological problems except as above or other neurological problems except as above. We discussed various options for his tremor including ultrasound and deep brain stimulation and gave him information on deep brain stimulation. He's had normal thyroid testing in the past. He occasionally has to use 2 hands to hold a glass of liquid, and sometimes has to drink a glass of wine to sign checks more appropriately. . Stress and tension and fatigue makes his tremors worse, and the medicine helps, and occasionally wine, but not much else. He's had no weakness no sensory loss no headaches no visual symptoms no gait or balance problems. He had no family history of tremors. He's had no other new neurological symptoms, and is now retired. He has no family history of tremor    On complete review of systems and symptoms he has had no new medical problems complications or illnesses. He has a history of essential tremor, hypertension, hyperlipidemia. Past Medical History:   Diagnosis Date    Enlarged prostate     Essential hypertension     Hyperlipemia     Sleep apnea       Past Surgical History:   Procedure Laterality Date    HI ABDOMEN SURGERY PROC UNLISTED       Family History   Problem Relation Age of Onset   NiBessieandrade Cancer Mother         breast    Dementia Father     Cancer Father         bladder      Social History     Tobacco Use    Smoking status: Never Smoker    Smokeless tobacco: Never Used   Substance Use Topics    Alcohol use: Yes     Comment: socially       Current Outpatient Medications   Medication Sig Dispense Refill    primidone (Mysoline) 250 mg tablet Take 1 Tablet by mouth nightly.  90 Tablet 4    amantadine HCL (SYMMETREL) 100 mg capsule Take 1 Capsule by mouth two (2) times a day. 100 Capsule 11    primidone (MYSOLINE) 50 mg tablet Take 3 Tablets by mouth daily. (Patient taking differently: Take 150 mg by mouth every morning.) 270 Tablet 4    propranoloL (INDERAL) 10 mg tablet Take 1 Tab by mouth two (2) times a day. 100 Tab 11    losartan (COZAAR) 50 mg tablet daily. 0    finasteride (PROSCAR) 5 mg tablet 5 mg daily. 0    tamsulosin (FLOMAX) 0.4 mg capsule 0.4 mg daily. 0    simvastatin (ZOCOR) 40 mg tablet 20 mg nightly.  aspirin delayed-release 81 mg tablet Take  by mouth daily.  Multivit,Ca,Iron-FA-Lyco-Lut (CENTRAL-JERMAINE) -036-250 mg-mcg-mcg-mcg tab Take  by mouth.  Cholecalciferol, Vitamin D3, (VITAMIN D3) 1,000 unit cap Take 1,000 Units by mouth daily. No Known Allergies     Review of Systems:  A comprehensive review of systems was negative except for: Neurological: positive for tremor   Vitals:    08/09/21 1018   BP: 125/73   Pulse: 66   Resp: 12   SpO2: 96%   Weight: 226 lb (102.5 kg)   Height: 5' 11\" (1.803 m)     Objective:     I      NEUROLOGICAL EXAM:    Appearance: The patient is well developed, well nourished, provides a coherent history and is in no acute distress. Mental Status: Oriented to time, place and person, and the president, cognitive function is normal, speech is fluent. Mood and affect appropriate. Cranial Nerves:   Intact visual fields. Fundi are benign, discs are flat. JACQUELINE, EOM's full, no nystagmus, no ptosis. Facial sensation is normal. Corneal reflexes are not tested. Facial movement is symmetric. Hearing is normal bilaterally. Palate is midline with normal sternocleidomastoid and trapezius muscles are normal. Tongue is midline. Neck without meningismus or bruits  Temporal arteries are not tender or enlarged    Motor:  5/5 strength in upper and lower proximal and distal muscles. Normal bulk and tone. No fasciculations.   Patient does move slowly because of his back pain at times. Rapid altering movement is slow but symmetric bilaterally   Reflexes:   Deep tendon reflexes 2+/4 and symmetrical.  No babinski or clonus present  Patient has positive head hanging position vertigo when he sits up quickly, worse with head tilted to the right suggesting right vestibular dysfunction  Hearing is mildly decreased only   Sensory:   Normal to touch, pinprick and vibration and temperature. DSS is intact   Gait:  Normal gait. Tremor: Moderate bilateral intention tremor, left side slightly worse than the right, and much worse with writing. Cerebellar:   Mildly abnormal Romberg and tandem cerebellar signs present  And finger-nose-finger examination is positive for the prominent tremor. Neurovascular:  Normal heart sounds and regular rhythm, peripheral pulses decreased, and no carotid bruits. Assessment:         Plan:     Patient with moderate severe essential tremor on 150 mg of Mysoline in the morning and 250 mg at night, and amantadine 100 mg once or twice a day and Inderal 10 mg twice a day and these were all renewed with patient today. He was advised of the side effect of the medication as far as confusion agitation, dryness of the mouth, hypotension dizziness sedation and nausea and other side effects and to call us immediately if the problem. Another option in the future might be to increase beta-blocker for his blood pressure, and off the Cozaar. Patient also given information on other nonmedicinal therapies including deep brain stimulation and ultrasound therapy, we gave him some information to read on deep brain stimulation. We offered to refer him to Dr. Ronald Day but he will read the information first and then call us back.   Patient with positional vertigo, probably vestibular dysfunction, we gave him Antivert to use as needed and recommend and he will continue his vestibular exercises at home which seem to be helping him control his vertigo. Carotid Dopplers 20 months ago were unremarkable and they will be repeated in this week to ensure stability of his stenosis. We had long discussion with patient about other options like deep brain stimulation or ultrasound therapy  Information on deep brain stimulation given to the patient today  35 minutes spent with patient today discussing his prognosis, further treatment options and therapy  He is advised to take a multivitamin and vitamin D. On a daily basis, remain mentally and physically active, exercise regularly  Followup in 12 months time or earlier if needed and patient will call for refills when needed    Signed By: Carlos Plascencia MD     August 9, 2021               If you have questions, please do not hesitate to call me. I look forward to following your patient along with you.       Sincerely,    Carlos Plascencia MD

## 2021-08-10 NOTE — PROGRESS NOTES
Consult    Subjective:     Del Ruiz is a 78 y.o. right-handed  male seen for evaluation at the request of Dr. Kan Rojas of problem with progressive tremors that were slowly worsening with time, and a need for repeat carotid Doppler study since has not had any in close to 2 years. Patient says the tremor seems stable this time, and he feels he is doing well and does not need to increase his medication, and is tolerating the medication well without any side effect, and his new treatment now is 150 mg of Mysoline in the morning, and 250 mg at night, and Inderal 10 mg twice a day and amantadine 100 mg twice a day also. We will continue this medication at current dose and refill sent in for patient says he is stable and not having any side effect on the medication. He does find that wine significantly helps him reduce his tremor and he commonly will drink a glass of wine before he has to write and that seems to help quite a bit. We also discussed deep brain stimulation and other non-medicinal therapeutic options like ultrasound therapy, and gave him information to read on DBS, and he will think about it, but he is not anxious for surgery and does not think he needs it now. .  He has had positional vertigo that seems to be better now after he got therapy and tried Antivert, and just does his exercises intermittently when the vertigo recurs. He had the Dopplers for dizziness. He has not had any hearing loss or tinnitus in his ears. He has had no new focal weakness or sensory loss, vision problems gait problems headaches, meningismus, or any other new neurological complaints. However he is also on 2 prostate medications that cause orthostasis, but he does have a rising PSA titer. He has no history of cancer, just benign prostatic hypertrophy.  He's had no other new focal weakness, sensory loss, visual change, or other new neurological problems except as above or other neurological problems except as above. We discussed various options for his tremor including ultrasound and deep brain stimulation and gave him information on deep brain stimulation. He's had normal thyroid testing in the past. He occasionally has to use 2 hands to hold a glass of liquid, and sometimes has to drink a glass of wine to sign checks more appropriately. . Stress and tension and fatigue makes his tremors worse, and the medicine helps, and occasionally wine, but not much else. He's had no weakness no sensory loss no headaches no visual symptoms no gait or balance problems. He had no family history of tremors. He's had no other new neurological symptoms, and is now retired. He has no family history of tremor    On complete review of systems and symptoms he has had no new medical problems complications or illnesses. He has a history of essential tremor, hypertension, hyperlipidemia. Past Medical History:   Diagnosis Date    Enlarged prostate     Essential hypertension     Hyperlipemia     Sleep apnea       Past Surgical History:   Procedure Laterality Date    FL ABDOMEN SURGERY PROC UNLISTED       Family History   Problem Relation Age of Onset   Azael Galla Cancer Mother         breast    Dementia Father     Cancer Father         bladder      Social History     Tobacco Use    Smoking status: Never Smoker    Smokeless tobacco: Never Used   Substance Use Topics    Alcohol use: Yes     Comment: socially       Current Outpatient Medications   Medication Sig Dispense Refill    primidone (Mysoline) 250 mg tablet Take 1 Tablet by mouth nightly. 90 Tablet 4    amantadine HCL (SYMMETREL) 100 mg capsule Take 1 Capsule by mouth two (2) times a day. 100 Capsule 11    primidone (MYSOLINE) 50 mg tablet Take 3 Tablets by mouth daily. (Patient taking differently: Take 150 mg by mouth every morning.) 270 Tablet 4    propranoloL (INDERAL) 10 mg tablet Take 1 Tab by mouth two (2) times a day. 100 Tab 11    losartan (COZAAR) 50 mg tablet daily.   0    finasteride (PROSCAR) 5 mg tablet 5 mg daily. 0    tamsulosin (FLOMAX) 0.4 mg capsule 0.4 mg daily. 0    simvastatin (ZOCOR) 40 mg tablet 20 mg nightly.  aspirin delayed-release 81 mg tablet Take  by mouth daily.  Multivit,Ca,Iron-FA-Lyco-Lut (CENTRAL-JERMAINE) -824-250 mg-mcg-mcg-mcg tab Take  by mouth.  Cholecalciferol, Vitamin D3, (VITAMIN D3) 1,000 unit cap Take 1,000 Units by mouth daily. No Known Allergies     Review of Systems:  A comprehensive review of systems was negative except for: Neurological: positive for tremor   Vitals:    08/09/21 1018   BP: 125/73   Pulse: 66   Resp: 12   SpO2: 96%   Weight: 226 lb (102.5 kg)   Height: 5' 11\" (1.803 m)     Objective:     I      NEUROLOGICAL EXAM:    Appearance: The patient is well developed, well nourished, provides a coherent history and is in no acute distress. Mental Status: Oriented to time, place and person, and the president, cognitive function is normal, speech is fluent. Mood and affect appropriate. Cranial Nerves:   Intact visual fields. Fundi are benign, discs are flat. JACQUELINE, EOM's full, no nystagmus, no ptosis. Facial sensation is normal. Corneal reflexes are not tested. Facial movement is symmetric. Hearing is normal bilaterally. Palate is midline with normal sternocleidomastoid and trapezius muscles are normal. Tongue is midline. Neck without meningismus or bruits  Temporal arteries are not tender or enlarged    Motor:  5/5 strength in upper and lower proximal and distal muscles. Normal bulk and tone. No fasciculations. Patient does move slowly because of his back pain at times.   Rapid altering movement is slow but symmetric bilaterally   Reflexes:   Deep tendon reflexes 2+/4 and symmetrical.  No babinski or clonus present  Patient has positive head hanging position vertigo when he sits up quickly, worse with head tilted to the right suggesting right vestibular dysfunction  Hearing is mildly decreased only Sensory:   Normal to touch, pinprick and vibration and temperature. DSS is intact   Gait:  Normal gait. Tremor: Moderate bilateral intention tremor, left side slightly worse than the right, and much worse with writing. Cerebellar:   Mildly abnormal Romberg and tandem cerebellar signs present  And finger-nose-finger examination is positive for the prominent tremor. Neurovascular:  Normal heart sounds and regular rhythm, peripheral pulses decreased, and no carotid bruits. Assessment:         Plan:     Patient with moderate severe essential tremor on 150 mg of Mysoline in the morning and 250 mg at night, and amantadine 100 mg once or twice a day and Inderal 10 mg twice a day and these were all renewed with patient today. He was advised of the side effect of the medication as far as confusion agitation, dryness of the mouth, hypotension dizziness sedation and nausea and other side effects and to call us immediately if the problem. Another option in the future might be to increase beta-blocker for his blood pressure, and off the Cozaar. Patient also given information on other nonmedicinal therapies including deep brain stimulation and ultrasound therapy, we gave him some information to read on deep brain stimulation. We offered to refer him to Dr. Stefano Arthur but he will read the information first and then call us back. Patient with positional vertigo, probably vestibular dysfunction, we gave him Antivert to use as needed and recommend and he will continue his vestibular exercises at home which seem to be helping him control his vertigo. Carotid Dopplers 20 months ago were unremarkable and they will be repeated in this week to ensure stability of his stenosis.   We had long discussion with patient about other options like deep brain stimulation or ultrasound therapy  Information on deep brain stimulation given to the patient today  35 minutes spent with patient today discussing his prognosis, further treatment options and therapy  He is advised to take a multivitamin and vitamin D.  On a daily basis, remain mentally and physically active, exercise regularly  Followup in 12 months time or earlier if needed and patient will call for refills when needed    Signed By: Simon Wong MD     August 9, 2021

## 2022-02-23 ENCOUNTER — OFFICE VISIT (OUTPATIENT)
Dept: NEUROLOGY | Age: 72
End: 2022-02-23
Payer: MEDICARE

## 2022-02-23 DIAGNOSIS — G63 IMMUNE-MEDIATED NEUROPATHY (HCC): ICD-10-CM

## 2022-02-23 DIAGNOSIS — E53.8 B12 DEFICIENCY: ICD-10-CM

## 2022-02-23 DIAGNOSIS — D89.89 IMMUNE-MEDIATED NEUROPATHY (HCC): ICD-10-CM

## 2022-02-23 DIAGNOSIS — M47.27 LUMBOSACRAL RADICULOPATHY DUE TO DEGENERATIVE JOINT DISEASE OF SPINE: ICD-10-CM

## 2022-02-23 DIAGNOSIS — E11.42 DIABETIC PERIPHERAL NEUROPATHY (HCC): ICD-10-CM

## 2022-02-23 DIAGNOSIS — E53.8 B12 DEFICIENCY: Primary | ICD-10-CM

## 2022-02-23 DIAGNOSIS — G60.8 IDIOPATHIC SMALL FIBER SENSORY NEUROPATHY: ICD-10-CM

## 2022-02-23 PROCEDURE — 95886 MUSC TEST DONE W/N TEST COMP: CPT | Performed by: PSYCHIATRY & NEUROLOGY

## 2022-02-23 PROCEDURE — 95910 NRV CNDJ TEST 7-8 STUDIES: CPT | Performed by: PSYCHIATRY & NEUROLOGY

## 2022-02-23 NOTE — LETTER
2/23/2022 8:03 PM    Patient:  Johnny Biswas   YOB: 1950  Date of Visit: 2/23/2022      Dear   No Recipients: Thank you for referring Mr. Johnny Biswas to me for evaluation/treatment. Below are the relevant portions of my assessment and plan of care. Patient's EMG study today looked essentially normal, and did not show any clear signs of neuropathy, but we cannot completely rule out a small fiber neuropathy on the basis of this study, and repeat studies may be of further diagnostic benefit in 6 to 12 months time, and correlation with imaging modalities and metabolic studies would also be of further diagnostic benefit and if clinically indicated, skin biopsy for nerve fiber density may also be of further diagnostic benefit. Because of the patient's symptoms, we ordered a metabolic panel to rule out causes of possible neuropathy in her strokes. If you have questions, please do not hesitate to call me. I look forward to following Mr. Uvaldo Cline along with you.         Sincerely,      [facility]

## 2022-02-23 NOTE — PROCEDURES
ELECTRODIAGNOSTIC REPORT      Test Date:  2022    Patient: Dougie Matthew : 1950 Physician: Titus Alicia M.D. Sex: Male  < Ref Phys:      Technician: Elvis Hamilton    Patient History: Patient is a 62-year-old male with a history of progressive numbness in his feet, and coldness in his feet, for the last 6 months, for EMG study to rule out neuropathy or other neuromuscular disease. Patient with past history of essential tremor, but no other active neurologic disease. Neuro Exam: Patient with slight decreased pin temperature and vibration in both feet to about ankle level, but otherwise normal sensory examination in all extremities and slightly hypoactive but symmetric reflexes throughout and no Babinski or clonus present. Muscle strength is normal throughout as is muscle bulk and tone. Cranial nerves II through XII intact. Patient has a moderate essential tremor in both upper extremities with minimal head involvement. Cranial nerves II through XII intact. Mental status is normal.  Gait and station normal and cerebellar testing normal.    EMG report: This study shows electrophysiologic evidence of essentially normal EMG and nerve conduction study of both lower extremities as recorded, showing no evidence of slowing of the motor nerve conduction velocities, and no evidence of clear axonal neuropathy present. This study does not completely rule out a small fiber neuropathy, and correlation with imaging modalities and metabolic studies may be necessary if clinically indicated. Also skin biopsy for nerve fiber density may be of benefit in evaluating for small fiber neuropathy also. Clinical correlation is recommended.       EMG & NCV Findings:  Evaluation of the left Fibular motor and the right Fibular motor nerves showed normal distal onset latency (L4.1, R4.4 ms), normal amplitude (L3.0, R3.8 mV), normal conduction velocity (B Fib-Ankle, L53, R47 m/s), and normal conduction velocity (Poplt-B Fib, L43, R56 m/s). The left tibial motor nerve showed normal distal onset latency (5.2 ms), reduced amplitude (2.7 mV), and normal conduction velocity (Knee-Ankle, 44 m/s). The right tibial motor nerve showed normal distal onset latency (5.5 ms), normal amplitude (4.4 mV), and normal conduction velocity (Knee-Ankle, 42 m/s). The left Sup Fibular sensory and the right Sup Fibular sensory nerves showed normal distal peak latency (L2.6, R3.6 ms), normal amplitude (L8.2, R6.1 µV), and normal conduction velocity (Lower leg-Lat ankle, L59, R38 m/s). The left sural sensory and the right sural sensory nerves showed normal distal peak latency (L3.2, R3.7 ms) and normal amplitude (L5.8, R6.8 µV).                 __________________  Rita Ware MD        Nerve Conduction Studies  Anti Sensory Summary Table     Stim Site NR Onset (ms) Peak (ms) O-P Amp (µV) Norm Peak (ms) Norm O-P Amp Site1 Site2 Dist (cm) Norm Shay (m/s)   Left Sup Fibular Anti Sensory (Lat ankle)  35°C   Lower leg    1.7 2.6 8.2 <4.4 >5.0 Lower leg Lat ankle 10.0 >32   Right Sup Fibular Anti Sensory (Lat ankle)  35°C   Lower leg    2.6 3.6 6.1 <4.4 >5.0 Lower leg Lat ankle 10.0 >32   Left Sural Anti Sensory (Lat Mall)  35°C   Calf    2.4 3.2 5.8 <4.5 >4.0 Calf Lat Mall 14.0    Site 2    2.1 3.1 7.6         Right Sural Anti Sensory (Lat Mall)  35°C   Calf    2.8 3.7 6.8 <4.5 >4.0 Calf Lat Mall 14.0      Motor Summary Table     Stim Site NR Onset (ms) Norm Onset (ms) O-P Amp (mV) Norm O-P Amp P-T Amp (mV) Site1 Site2 Dist (cm) Shay (m/s)   Left Fibular Motor (Ext Dig Brev)  35°C   Ankle    4.1 <6.5 3.0 >1.1  Ankle Ext Dig Brev 8.0 20   B Fib    11.3  2.4   B Fib Ankle 38.0 53   Poplt    13.6  2.1   Poplt B Fib 10.0 43   Right Fibular Motor (Ext Dig Brev)  35°C   Ankle    4.4 <6.5 3.8 >1.1  Ankle Ext Dig Brev 8.0 18   B Fib    12.5  2.8   B Fib Ankle 38.0 47   Poplt    14.3  2.1   Poplt B Fib 10.0 56   Left Tibial Motor (Abd Anaya Brev)  35°C   Ankle 5. 2 <6.1 2.7 >4.4  Ankle Abd Anaya Brev 8.0 15   Knee    14.8  2.3   Knee Ankle 42.0 44   Right Tibial Motor (Abd Anaya Brev)  35°C   Ankle    5.5 <6.1 4.4 >4.4  Ankle Abd Anaya Brev 8.0 15   Knee    15.7  2.5   Knee Ankle 43.0 42     EMG     Side Muscle Nerve Root Ins Act Fibs Psw Recrt Duration Amp Poly Comment   Left AntTibialis Dp Br Peron L4-5 Nml Nml Nml Nml Nml Nml Nml    Left MedGastroc Tibial S1-2 Nml Nml Nml Nml Nml Nml Nml    Left VastusLat Femoral L2-4 Nml Nml Nml Nml Nml Nml Nml    Left Lower Lumb Parasp Rami L5,S1 Nml Nml Nml Nml Nml Nml Nml    Left Ext Dig Brev Dp Br Peron L5, S1 Nml Nml Nml Nml Nml Nml Nml    Left AbdHallucis MedPlantar S1-2 Nml Nml Nml Nml Nml Nml Nml    Right AntTibialis Dp Br Peron L4-5 Nml Nml Nml Nml Nml Nml Nml    Right GluteusMed SupGluteal L4-S1 Nml Nml Nml Nml Nml Nml Nml    Right VastusLat Femoral L2-4 Nml Nml Nml Nml Nml Nml Nml    Right Ext Dig Brev Dp Br Peron L5, S1 Nml Nml Nml Nml Nml Nml Nml    Right AbdHallucis MedPlantar S1-2 Nml Nml Nml Nml Nml Nml Nml    Right Lower Lumb Parasp Rami L5,S1 Nml Nml Nml Nml Nml Nml Nml          Waveforms:

## 2022-02-24 LAB
CK SERPL-CCNC: 162 U/L (ref 39–308)
ERYTHROCYTE [SEDIMENTATION RATE] IN BLOOD: 3 MM/HR (ref 0–20)
EST. AVERAGE GLUCOSE BLD GHB EST-MCNC: 103 MG/DL
FOLATE SERPL-MCNC: 18.8 NG/ML (ref 5–21)
HBA1C MFR BLD: 5.2 % (ref 4–5.6)
VIT B12 SERPL-MCNC: 459 PG/ML (ref 193–986)

## 2022-02-24 NOTE — PROGRESS NOTES
Patient's EMG study today looked essentially normal, and did not show any clear signs of neuropathy, but we cannot completely rule out a small fiber neuropathy on the basis of this study, and repeat studies may be of further diagnostic benefit in 6 to 12 months time, and correlation with imaging modalities and metabolic studies would also be of further diagnostic benefit and if clinically indicated, skin biopsy for nerve fiber density may also be of further diagnostic benefit. Because of the patient's symptoms, we ordered a metabolic panel to rule out causes of possible neuropathy in her strokes.

## 2022-02-25 LAB
CENTROMERE B AB SER-ACNC: <0.2 AI (ref 0–0.9)
CHROMATIN AB SERPL-ACNC: <0.2 AI (ref 0–0.9)
DSDNA AB SER-ACNC: <1 IU/ML (ref 0–9)
ENA JO1 AB SER-ACNC: <0.2 AI (ref 0–0.9)
ENA RNP AB SER-ACNC: <0.2 AI (ref 0–0.9)
ENA SCL70 AB SER-ACNC: <0.2 AI (ref 0–0.9)
ENA SM AB SER-ACNC: <0.2 AI (ref 0–0.9)
ENA SM+RNP AB SER-ACNC: <0.2 AI (ref 0–0.9)
ENA SS-A AB SER-ACNC: <0.2 AI (ref 0–0.9)
ENA SS-B AB SER-ACNC: <0.2 AI (ref 0–0.9)
RIBOSOMAL P AB SER-ACNC: <0.2 AI (ref 0–0.9)
SEE BELOW:, 164879: NORMAL

## 2022-03-01 LAB
GM1 IGG AUTOANTIBODIES, GM1GAT: <20 % (ref 0–30)
IGA SERPL-MCNC: 239 MG/DL (ref 61–437)
IGG SERPL-MCNC: 672 MG/DL (ref 603–1613)
IGM SERPL-MCNC: 35 MG/DL (ref 20–172)
INTERPRETATION, RGM1GT: NORMAL
MAG AB, IGM, ANMGLT: <900 BTU (ref 0–999)
MAG IGM AUTO-AB INTERPRETATION: NORMAL
PROT PATTERN SERPL IFE-IMP: ABNORMAL

## 2022-03-11 LAB
INTERPRETATION, NEU2LT: NORMAL
METHOD, NEU3LT: NORMAL
NEURONAL CELL AB, NEU1LT: 7 UNITS (ref 0–54)

## 2022-03-18 PROBLEM — G63 IMMUNE-MEDIATED NEUROPATHY (HCC): Status: ACTIVE | Noted: 2022-02-23

## 2022-03-18 PROBLEM — M47.27 LUMBOSACRAL RADICULOPATHY DUE TO DEGENERATIVE JOINT DISEASE OF SPINE: Status: ACTIVE | Noted: 2022-02-23

## 2022-03-18 PROBLEM — D89.89 IMMUNE-MEDIATED NEUROPATHY (HCC): Status: ACTIVE | Noted: 2022-02-23

## 2022-03-19 PROBLEM — G60.8 IDIOPATHIC SMALL FIBER SENSORY NEUROPATHY: Status: ACTIVE | Noted: 2022-02-23

## 2022-03-19 PROBLEM — E11.42 DIABETIC PERIPHERAL NEUROPATHY (HCC): Status: ACTIVE | Noted: 2022-02-23

## 2022-03-20 PROBLEM — E53.8 B12 DEFICIENCY: Status: ACTIVE | Noted: 2022-02-23

## 2022-06-16 DIAGNOSIS — I65.23 STENOSIS OF BOTH INTERNAL CAROTID ARTERIES: ICD-10-CM

## 2022-06-16 DIAGNOSIS — G25.0 ESSENTIAL TREMOR: ICD-10-CM

## 2022-06-16 DIAGNOSIS — G25.0 BENIGN ESSENTIAL TREMOR SYNDROME: ICD-10-CM

## 2022-06-16 DIAGNOSIS — R42 DIZZINESS AND GIDDINESS: ICD-10-CM

## 2022-06-16 RX ORDER — PROPRANOLOL HYDROCHLORIDE 10 MG/1
10 TABLET ORAL 2 TIMES DAILY
Qty: 100 TABLET | Refills: 11 | Status: SHIPPED | OUTPATIENT
Start: 2022-06-16

## 2022-08-10 ENCOUNTER — OFFICE VISIT (OUTPATIENT)
Dept: NEUROLOGY | Age: 72
End: 2022-08-10
Payer: MEDICARE

## 2022-08-10 VITALS
DIASTOLIC BLOOD PRESSURE: 72 MMHG | BODY MASS INDEX: 31.11 KG/M2 | RESPIRATION RATE: 18 BRPM | HEART RATE: 72 BPM | TEMPERATURE: 97.5 F | SYSTOLIC BLOOD PRESSURE: 112 MMHG | WEIGHT: 222.2 LBS | HEIGHT: 71 IN | OXYGEN SATURATION: 96 %

## 2022-08-10 DIAGNOSIS — M47.27 LUMBOSACRAL RADICULOPATHY DUE TO DEGENERATIVE JOINT DISEASE OF SPINE: ICD-10-CM

## 2022-08-10 DIAGNOSIS — G25.0 ESSENTIAL TREMOR: ICD-10-CM

## 2022-08-10 DIAGNOSIS — G44.229 CHRONIC TENSION-TYPE HEADACHE, NOT INTRACTABLE: ICD-10-CM

## 2022-08-10 DIAGNOSIS — G25.0 BENIGN ESSENTIAL TREMOR SYNDROME: ICD-10-CM

## 2022-08-10 DIAGNOSIS — G63 IMMUNE-MEDIATED NEUROPATHY (HCC): ICD-10-CM

## 2022-08-10 DIAGNOSIS — I65.23 STENOSIS OF BOTH INTERNAL CAROTID ARTERIES: Primary | ICD-10-CM

## 2022-08-10 DIAGNOSIS — R42 DIZZINESS AND GIDDINESS: ICD-10-CM

## 2022-08-10 DIAGNOSIS — G60.8 IDIOPATHIC SMALL FIBER SENSORY NEUROPATHY: ICD-10-CM

## 2022-08-10 DIAGNOSIS — I67.89 CEREBRAL MICROVASCULAR DISEASE: ICD-10-CM

## 2022-08-10 DIAGNOSIS — D89.89 IMMUNE-MEDIATED NEUROPATHY (HCC): ICD-10-CM

## 2022-08-10 PROCEDURE — 3017F COLORECTAL CA SCREEN DOC REV: CPT | Performed by: PSYCHIATRY & NEUROLOGY

## 2022-08-10 PROCEDURE — G8754 DIAS BP LESS 90: HCPCS | Performed by: PSYCHIATRY & NEUROLOGY

## 2022-08-10 PROCEDURE — 99214 OFFICE O/P EST MOD 30 MIN: CPT | Performed by: PSYCHIATRY & NEUROLOGY

## 2022-08-10 PROCEDURE — G8427 DOCREV CUR MEDS BY ELIG CLIN: HCPCS | Performed by: PSYCHIATRY & NEUROLOGY

## 2022-08-10 PROCEDURE — G8417 CALC BMI ABV UP PARAM F/U: HCPCS | Performed by: PSYCHIATRY & NEUROLOGY

## 2022-08-10 PROCEDURE — G8510 SCR DEP NEG, NO PLAN REQD: HCPCS | Performed by: PSYCHIATRY & NEUROLOGY

## 2022-08-10 PROCEDURE — 1123F ACP DISCUSS/DSCN MKR DOCD: CPT | Performed by: PSYCHIATRY & NEUROLOGY

## 2022-08-10 PROCEDURE — G8752 SYS BP LESS 140: HCPCS | Performed by: PSYCHIATRY & NEUROLOGY

## 2022-08-10 PROCEDURE — 1101F PT FALLS ASSESS-DOCD LE1/YR: CPT | Performed by: PSYCHIATRY & NEUROLOGY

## 2022-08-10 PROCEDURE — G8536 NO DOC ELDER MAL SCRN: HCPCS | Performed by: PSYCHIATRY & NEUROLOGY

## 2022-08-10 RX ORDER — PRIMIDONE 250 MG/1
250 TABLET ORAL
Qty: 90 TABLET | Refills: 4 | Status: SHIPPED | OUTPATIENT
Start: 2022-08-10

## 2022-08-10 RX ORDER — GABAPENTIN 300 MG/1
CAPSULE ORAL
COMMUNITY
Start: 2022-05-30

## 2022-08-10 RX ORDER — PRIMIDONE 50 MG/1
150 TABLET ORAL DAILY
Qty: 270 TABLET | Refills: 4 | Status: SHIPPED | OUTPATIENT
Start: 2022-08-10

## 2022-08-10 RX ORDER — AMANTADINE HYDROCHLORIDE 100 MG/1
100 CAPSULE, GELATIN COATED ORAL 2 TIMES DAILY
Qty: 100 CAPSULE | Refills: 11 | Status: SHIPPED | OUTPATIENT
Start: 2022-08-10

## 2022-08-10 NOTE — LETTER
8/10/2022    Patient: Ilsa Kruger   YOB: 1950   Date of Visit: 8/10/2022     Hilario Castellanos, [address] [facility] [address]  [address] [address]  Via Fax: [phone]    Dear Hilario Castellanos MD,      Thank you for referring Mr. Ilsa Kruger to [address] [place] [address] for evaluation. My notes for this consultation are attached. Chief Complaint   Patient presents with    Neuropathy     Follow up -status qou-did have covid two-three weeks ago - \"felt like a cold\"     1. Have you been to the ER, urgent care clinic since your last visit? Hospitalized since your last visit? No     2. Have you seen or consulted any other health care providers outside of the [address] [place] [address] since your last visit? Include any pap smears or colon screening. Yes PCP and Dermatologist Kate Escoto and Dr Uma Garcia - urologist       Consult    Subjective:     Ilsa Kruger is a 73 y.o. right-handed  male seen for evaluation at the request of Dr. Sanches Like of new problem with numbness in his legs about the knee down, for which she had an EMG study done in February 2022 that was basically normal and showed no clear signs of neuropathy, but does not completely rule out small fiber neuropathy, but his hemoglobin A1c was normal, showing no signs of diabetes which is the most common cause of small fiber neuropathy. We discussed getting a skin biopsy but he was placed on gabapentin by his PCP and feels that that has not progressed and he is sleeping well at nighttime taking 300 mg of gabapentin just once at night. All of his other neuropathy panels were negative for any other treatable cause of neuropathy. He is doing so much better it does not appear that he needs genetic testing sent to either [facility] or [place] [facility], because those are all genetic and untreatable and  Treat them the same as you would any other neuropathy.   Patient is also seen for progressive tremors that were slowly worsening with time, and he is taking 150 mg of Mysoline in the morning and 250 mg at night and Inderal 10 mg twice a day and amantadine 100 mg twice a day and feels that his tremor has only progressed a little bit since the last visit and does not need any increase in medication. His medications were all renewed for him today that were renewable. The gabapentin he is taking may help the tremor and it seems to have helped his restless leg syndrome some also. He had a repeat carotid Doppler study done 1 year ago and it showed mild disease only, we will wait another year and do 1 next year. We will continue this medication at current dose and refill sent in for patient says he is stable and not having any side effect on the medication. He does find that wine significantly helps him reduce his tremor and he commonly will drink a glass of wine before he has to write and that seems to help quite a bit. We also discussed deep brain stimulation and other non-medicinal therapeutic options like ultrasound therapy, and gave him information to read on DBS, and he will think about it, but he is not anxious for surgery and does not think he needs it now. .  He has had positional vertigo that seems to be better now after he got therapy and tried Antivert, and just does his exercises intermittently when the vertigo recurs. He had the Dopplers for dizziness. He has not had any hearing loss or tinnitus in his ears. He has had no new focal weakness or sensory loss, vision problems gait problems headaches, meningismus, or any other new neurological complaints. However he is also on 2 prostate medications that cause orthostasis, but he does have a rising PSA titer. He has no history of cancer, just benign prostatic hypertrophy. He's had no other new focal weakness, sensory loss, visual change, or other new neurological problems except as above or other neurological problems except as above.  We discussed various options for his tremor including ultrasound and deep brain stimulation and gave him information on deep brain stimulation. He's had normal thyroid testing in the past. He occasionally has to use 2 hands to hold a glass of liquid, and sometimes has to drink a glass of wine to sign checks more appropriately. . Stress and tension and fatigue makes his tremors worse, and the medicine helps, and occasionally wine, but not much else. He's had no weakness no sensory loss no headaches no visual symptoms no gait or balance problems. He had no family history of tremors. He's had no other new neurological symptoms, and is now retired. He has no family history of tremor  He also wanted to discuss exercises we recommended all 3 exercises, cardiovascular or aerobic exercises, and strength training and fitness training including calisthenics, weights or machines to build up the muscles, and finally we recommended balance and flexibility and stretching exercises to maintain mobility and functional training. We went over all of these in detail. On complete review of systems and symptoms he has had no new medical problems complications or illnesses. He has a history of essential tremor, hypertension, hyperlipidemia.     Past Medical History:   Diagnosis Date    Enlarged prostate     Essential hypertension     Hyperlipemia     Sleep apnea       Past Surgical History:   Procedure Laterality Date    MS ABDOMEN SURGERY PROC UNLISTED       Family History   Problem Relation Age of Onset   Blase Liming Cancer Mother         breast    Dementia Father     Cancer Father         bladder      Social History     Tobacco Use    Smoking status: Never    Smokeless tobacco: Never   Substance Use Topics    Alcohol use: Yes     Comment: socially       Current Outpatient Medications   Medication Sig Dispense Refill    gabapentin (NEURONTIN) 300 mg capsule take 1 capsule by mouth every NIGHT at bedtime      amantadine HCL (SYMMETREL) 100 mg capsule Take 1 Capsule by mouth two (2) times a day. 100 Capsule 11    primidone (MYSOLINE) 50 mg tablet Take 3 Tablets by mouth in the morning. 270 Tablet 4    primidone (Mysoline) 250 mg tablet Take 1 Tablet by mouth nightly. 90 Tablet 4    propranoloL (INDERAL) 10 mg tablet Take 1 Tablet by mouth two (2) times a day. 100 Tablet 11    losartan (COZAAR) 50 mg tablet daily. 0    finasteride (PROSCAR) 5 mg tablet 5 mg daily. 0    tamsulosin (FLOMAX) 0.4 mg capsule 0.4 mg daily. 0    simvastatin (ZOCOR) 40 mg tablet 20 mg nightly.  aspirin delayed-release 81 mg tablet Take  by mouth daily.  Multivit,Ca,Iron-FA-Lyco-Lut -830-250 mg-mcg-mcg-mcg tab Take  by mouth.  cholecalciferol (VITAMIN D3) 25 mcg (1,000 unit) cap Take 1,000 Units by mouth daily. No Known Allergies     Review of Systems:  A comprehensive review of systems was negative except for: Neurological: positive for tremor   Vitals:    08/10/22 1106   BP: 112/72   Pulse: 72   Resp: 18   Temp: 97.5 °F (36.4 °C)   TempSrc: Oral   SpO2: 96%   Weight: 222 lb 3.2 oz (100.8 kg)   Height: 5' 11\" (1.803 m)     Objective:     I      NEUROLOGICAL EXAM:    Appearance: The patient is well developed, well nourished, provides a coherent history and is in no acute distress. Mental Status: Oriented to time, place and person, and the president, cognitive function is normal, speech is fluent. Mood and affect appropriate. Cranial Nerves:   Intact visual fields. Fundi are benign, discs are flat. JACQUELINE, EOM's full, no nystagmus, no ptosis. Facial sensation is normal. Corneal reflexes are not tested. Facial movement is symmetric. Hearing is normal bilaterally. Palate is midline with normal sternocleidomastoid and trapezius muscles are normal. Tongue is midline. Neck without meningismus or bruits  Temporal arteries are not tender or enlarged    Motor:  5/5 strength in upper and lower proximal and distal muscles. Normal bulk and tone.  No fasciculations. Patient does move slowly because of his back pain at times. Rapid altering movement is slow but symmetric bilaterally   Reflexes:   Deep tendon reflexes 2+/4 and symmetrical.  No babinski or clonus present  Patient has positive head hanging position vertigo when he sits up quickly, worse with head tilted to the right suggesting right vestibular dysfunction  Hearing is mildly decreased only   Sensory:   Mildly abnormal to touch, pinprick and vibration and temperature in both feet to ankle level. DSS is intact   Gait:  Normal gait. Tremor: Moderate bilateral intention tremor, left side slightly worse than the right, and much worse with writing. Cerebellar:   Mildly abnormal Romberg and tandem cerebellar signs present  And finger-nose-finger examination is positive for the prominent tremor. Neurovascular:  Normal heart sounds and regular rhythm, peripheral pulses decreased, and no carotid bruits. 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 Also sent with a skin biopsy, they are going to just have to take the same treatment. Patient with new problem of progressive moderate severe essential tremor on 150 mg of Mysoline in the morning and 250 mg at night, and amantadine 100 mg once or twice a day and Inderal 10 mg twice a day, but he does not feel he needs new medication yet and the Neurontin may be helping his tremor some so we will continue that because he is taking it for his neuropathy, and these tremor medications were all renewed for the patient today.   He was advised of the side effect of the medication as far as confusion agitation, dryness of the mouth, hypotension dizziness sedation and nausea and other side effects and to call us immediately if the problem. Another option in the future might be to increase beta-blocker for his blood pressure, and off the Cozaar. Patient also given information on other nonmedicinal therapies including deep brain stimulation and ultrasound therapy, we gave him some information to read on deep brain stimulation. We offered to refer him to Dr. Jackie Vasquez but he will read the information first and then call us back. Patient with positional vertigo, probably vestibular dysfunction, we gave him Antivert to use as needed and recommend and he will continue his vestibular exercises at home which seem to be helping him control his vertigo. Carotid Dopplers 12 months ago were unremarkable and they will be repeated next year to ensure stability of his stenosis. We had long discussion with patient about other options like deep brain stimulation or ultrasound therapy  Information on deep brain stimulation given to the patient today  We discussed exercise programs and stay mentally and physically active, and the need to do strengthening exercises in addition to cardiovascular status stretching flexibility and balance exercises all of this is important as the aerobic exercises. 33 minutes spent with patient today discussing his prognosis, further treatment options and therapy  He is advised to take a multivitamin and vitamin D. On a daily basis, remain mentally and physically active, exercise regularly  Followup in 12 months time or earlier if needed and patient will call for refills when needed    Signed By: Ling Garcia MD     August 10, 2022               If you have questions, please do not hesitate to call me. I look forward to following your patient along with you.       Sincerely,    Ling Garcia MD

## 2022-08-10 NOTE — PROGRESS NOTES
Consult    Subjective:     Vanesa Marin is a 73 y.o. right-handed  male seen for evaluation at the request of Dr. Abigail Diaz of new problem with numbness in his legs about the knee down, for which she had an EMG study done in February 2022 that was basically normal and showed no clear signs of neuropathy, but does not completely rule out small fiber neuropathy, but his hemoglobin A1c was normal, showing no signs of diabetes which is the most common cause of small fiber neuropathy. We discussed getting a skin biopsy but he was placed on gabapentin by his PCP and feels that that has not progressed and he is sleeping well at nighttime taking 300 mg of gabapentin just once at night. All of his other neuropathy panels were negative for any other treatable cause of neuropathy. He is doing so much better it does not appear that he needs genetic testing sent to either [facility] or [facility], because those are all genetic and untreatable and  Treat them the same as you would any other neuropathy. Patient is also seen for progressive tremors that were slowly worsening with time, and he is taking 150 mg of Mysoline in the morning and 250 mg at night and Inderal 10 mg twice a day and amantadine 100 mg twice a day and feels that his tremor has only progressed a little bit since the last visit and does not need any increase in medication. His medications were all renewed for him today that were renewable. The gabapentin he is taking may help the tremor and it seems to have helped his restless leg syndrome some also. He had a repeat carotid Doppler study done 1 year ago and it showed mild disease only, we will wait another year and do 1 next year. We will continue this medication at current dose and refill sent in for patient says he is stable and not having any side effect on the medication.   He does find that wine significantly helps him reduce his tremor and he commonly will drink a glass of wine before he has to write and that seems to help quite a bit. We also discussed deep brain stimulation and other non-medicinal therapeutic options like ultrasound therapy, and gave him information to read on DBS, and he will think about it, but he is not anxious for surgery and does not think he needs it now. .  He has had positional vertigo that seems to be better now after he got therapy and tried Antivert, and just does his exercises intermittently when the vertigo recurs. He had the Dopplers for dizziness. He has not had any hearing loss or tinnitus in his ears. He has had no new focal weakness or sensory loss, vision problems gait problems headaches, meningismus, or any other new neurological complaints. However he is also on 2 prostate medications that cause orthostasis, but he does have a rising PSA titer. He has no history of cancer, just benign prostatic hypertrophy. He's had no other new focal weakness, sensory loss, visual change, or other new neurological problems except as above or other neurological problems except as above. We discussed various options for his tremor including ultrasound and deep brain stimulation and gave him information on deep brain stimulation. He's had normal thyroid testing in the past. He occasionally has to use 2 hands to hold a glass of liquid, and sometimes has to drink a glass of wine to sign checks more appropriately. . Stress and tension and fatigue makes his tremors worse, and the medicine helps, and occasionally wine, but not much else. He's had no weakness no sensory loss no headaches no visual symptoms no gait or balance problems. He had no family history of tremors. He's had no other new neurological symptoms, and is now retired.   He has no family history of tremor  He also wanted to discuss exercises we recommended all 3 exercises, cardiovascular or aerobic exercises, and strength training and fitness training including calisthenics, weights or machines to build up the muscles, and finally we recommended balance and flexibility and stretching exercises to maintain mobility and functional training. We went over all of these in detail. On complete review of systems and symptoms he has had no new medical problems complications or illnesses. He has a history of essential tremor, hypertension, hyperlipidemia. Past Medical History:   Diagnosis Date    Enlarged prostate     Essential hypertension     Hyperlipemia     Sleep apnea       Past Surgical History:   Procedure Laterality Date    CT ABDOMEN SURGERY PROC UNLISTED       Family History   Problem Relation Age of Onset    Cancer Mother         breast    Dementia Father     Cancer Father         bladder      Social History     Tobacco Use    Smoking status: Never    Smokeless tobacco: Never   Substance Use Topics    Alcohol use: Yes     Comment: socially       Current Outpatient Medications   Medication Sig Dispense Refill    gabapentin (NEURONTIN) 300 mg capsule take 1 capsule by mouth every NIGHT at bedtime      amantadine HCL (SYMMETREL) 100 mg capsule Take 1 Capsule by mouth two (2) times a day. 100 Capsule 11    primidone (MYSOLINE) 50 mg tablet Take 3 Tablets by mouth in the morning. 270 Tablet 4    primidone (Mysoline) 250 mg tablet Take 1 Tablet by mouth nightly. 90 Tablet 4    propranoloL (INDERAL) 10 mg tablet Take 1 Tablet by mouth two (2) times a day. 100 Tablet 11    losartan (COZAAR) 50 mg tablet daily. 0    finasteride (PROSCAR) 5 mg tablet 5 mg daily. 0    tamsulosin (FLOMAX) 0.4 mg capsule 0.4 mg daily. 0    simvastatin (ZOCOR) 40 mg tablet 20 mg nightly. aspirin delayed-release 81 mg tablet Take  by mouth daily. Multivit,Ca,Iron-FA-Lyco-Lut -717-250 mg-mcg-mcg-mcg tab Take  by mouth. cholecalciferol (VITAMIN D3) 25 mcg (1,000 unit) cap Take 1,000 Units by mouth daily.           No Known Allergies     Review of Systems:  A comprehensive review of systems was negative except for: Neurological: positive for tremor   Vitals:    08/10/22 1106   BP: 112/72   Pulse: 72   Resp: 18   Temp: 97.5 °F (36.4 °C)   TempSrc: Oral   SpO2: 96%   Weight: 222 lb 3.2 oz (100.8 kg)   Height: 5' 11\" (1.803 m)     Objective:     I      NEUROLOGICAL EXAM:    Appearance: The patient is well developed, well nourished, provides a coherent history and is in no acute distress. Mental Status: Oriented to time, place and person, and the president, cognitive function is normal, speech is fluent. Mood and affect appropriate. Cranial Nerves:   Intact visual fields. Fundi are benign, discs are flat. JACQUELINE, EOM's full, no nystagmus, no ptosis. Facial sensation is normal. Corneal reflexes are not tested. Facial movement is symmetric. Hearing is normal bilaterally. Palate is midline with normal sternocleidomastoid and trapezius muscles are normal. Tongue is midline. Neck without meningismus or bruits  Temporal arteries are not tender or enlarged    Motor:  5/5 strength in upper and lower proximal and distal muscles. Normal bulk and tone. No fasciculations. Patient does move slowly because of his back pain at times. Rapid altering movement is slow but symmetric bilaterally   Reflexes:   Deep tendon reflexes 2+/4 and symmetrical.  No babinski or clonus present  Patient has positive head hanging position vertigo when he sits up quickly, worse with head tilted to the right suggesting right vestibular dysfunction  Hearing is mildly decreased only   Sensory:   Mildly abnormal to touch, pinprick and vibration and temperature in both feet to ankle level. DSS is intact   Gait:  Normal gait. Tremor: Moderate bilateral intention tremor, left side slightly worse than the right, and much worse with writing. Cerebellar:   Mildly abnormal Romberg and tandem cerebellar signs present  And finger-nose-finger examination is positive for the prominent tremor.    Neurovascular:  Normal heart sounds and regular rhythm, peripheral pulses decreased, and no carotid bruits. 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 Also sent with a skin biopsy, they are going to just have to take the same treatment. Patient with new problem of progressive moderate severe essential tremor on 150 mg of Mysoline in the morning and 250 mg at night, and amantadine 100 mg once or twice a day and Inderal 10 mg twice a day, but he does not feel he needs new medication yet and the Neurontin may be helping his tremor some so we will continue that because he is taking it for his neuropathy, and these tremor medications were all renewed for the patient today. He was advised of the side effect of the medication as far as confusion agitation, dryness of the mouth, hypotension dizziness sedation and nausea and other side effects and to call us immediately if the problem. Another option in the future might be to increase beta-blocker for his blood pressure, and off the Cozaar. Patient also given information on other nonmedicinal therapies including deep brain stimulation and ultrasound therapy, we gave him some information to read on deep brain stimulation. We offered to refer him to Dr. Brian Ryan but he will read the information first and then call us back. Patient with positional vertigo, probably vestibular dysfunction, we gave him Antivert to use as needed and recommend and he will continue his vestibular exercises at home which seem to be helping him control his vertigo. Carotid Dopplers 12 months ago were unremarkable and they will be repeated next year to ensure stability of his stenosis.   We had long discussion with patient about other options like deep brain stimulation or ultrasound therapy  Information on deep brain stimulation given to the patient today  We discussed exercise programs and stay mentally and physically active, and the need to do strengthening exercises in addition to cardiovascular status stretching flexibility and balance exercises all of this is important as the aerobic exercises. 33 minutes spent with patient today discussing his prognosis, further treatment options and therapy  He is advised to take a multivitamin and vitamin D.  On a daily basis, remain mentally and physically active, exercise regularly  Followup in 12 months time or earlier if needed and patient will call for refills when needed    Signed By: Pavel Talamantes MD     August 10, 2022

## 2022-08-10 NOTE — PROGRESS NOTES
Chief Complaint   Patient presents with    Neuropathy     Follow up -status qou-did have covid two-three weeks ago - \"felt like a cold\"     1. Have you been to the ER, urgent care clinic since your last visit? Hospitalized since your last visit? No     2. Have you seen or consulted any other health care providers outside of the [address] [place] [address] since your last visit? Include any pap smears or colon screening.   Yes PCP and Dermatologist Jef Locke and Dr Keerthi Abarca - urologist

## 2022-12-15 ENCOUNTER — TRANSCRIBE ORDER (OUTPATIENT)
Dept: REGISTRATION | Age: 72
End: 2022-12-15

## 2022-12-15 ENCOUNTER — HOSPITAL ENCOUNTER (OUTPATIENT)
Dept: GENERAL RADIOLOGY | Age: 72
Discharge: HOME | End: 2022-12-15
Payer: MEDICARE

## 2022-12-15 DIAGNOSIS — M25.562 LEFT KNEE PAIN: Primary | ICD-10-CM

## 2022-12-15 DIAGNOSIS — M25.562 LEFT KNEE PAIN: ICD-10-CM

## 2022-12-15 PROCEDURE — 73562 X-RAY EXAM OF KNEE 3: CPT

## 2023-04-06 ENCOUNTER — TELEPHONE (OUTPATIENT)
Dept: NEUROLOGY | Age: 73
End: 2023-04-06

## 2023-04-06 NOTE — TELEPHONE ENCOUNTER
Pt states he is supposed to have a hosp fu appt with Dr. Dario Craft within the next 2 weeks due to the blood thinner script that was given to him a the [facility]. Pt states that Dr. Dario Craft spoke to the DrJeyson In the hospital. Please call with an appt.  [phone]

## 2023-04-06 NOTE — PROGRESS NOTES
Patient has pulmonary embolus and needs to get off his Mysoline and get on Eliquis, so we will have him stop the 50 mg pills in the morning and just take 250 at night for a week and then go down to one half of the 250s at night for a week and then stop the medication altogether, and have talked to Dr. Marli Burroughs.

## 2023-04-08 ENCOUNTER — PATIENT MESSAGE (OUTPATIENT)
Dept: NEUROLOGY | Age: 73
End: 2023-04-08

## 2023-05-03 ENCOUNTER — OFFICE VISIT (OUTPATIENT)
Dept: NEUROLOGY | Age: 73
End: 2023-05-03
Payer: MEDICARE

## 2023-05-03 VITALS
HEIGHT: 71 IN | OXYGEN SATURATION: 95 % | HEART RATE: 60 BPM | BODY MASS INDEX: 30.35 KG/M2 | DIASTOLIC BLOOD PRESSURE: 80 MMHG | RESPIRATION RATE: 18 BRPM | WEIGHT: 216.8 LBS | SYSTOLIC BLOOD PRESSURE: 118 MMHG

## 2023-05-03 DIAGNOSIS — E11.42 DIABETIC PERIPHERAL NEUROPATHY (HCC): ICD-10-CM

## 2023-05-03 DIAGNOSIS — G25.0 ESSENTIAL TREMOR: Primary | ICD-10-CM

## 2023-05-03 DIAGNOSIS — R42 DIZZINESS AND GIDDINESS: ICD-10-CM

## 2023-05-03 DIAGNOSIS — I65.23 STENOSIS OF BOTH INTERNAL CAROTID ARTERIES: ICD-10-CM

## 2023-05-03 PROCEDURE — 3074F SYST BP LT 130 MM HG: CPT | Performed by: PSYCHIATRY & NEUROLOGY

## 2023-05-03 PROCEDURE — G8417 CALC BMI ABV UP PARAM F/U: HCPCS | Performed by: PSYCHIATRY & NEUROLOGY

## 2023-05-03 PROCEDURE — 3046F HEMOGLOBIN A1C LEVEL >9.0%: CPT | Performed by: PSYCHIATRY & NEUROLOGY

## 2023-05-03 PROCEDURE — 99214 OFFICE O/P EST MOD 30 MIN: CPT | Performed by: PSYCHIATRY & NEUROLOGY

## 2023-05-03 PROCEDURE — 1123F ACP DISCUSS/DSCN MKR DOCD: CPT | Performed by: PSYCHIATRY & NEUROLOGY

## 2023-05-03 PROCEDURE — 1101F PT FALLS ASSESS-DOCD LE1/YR: CPT | Performed by: PSYCHIATRY & NEUROLOGY

## 2023-05-03 PROCEDURE — G8536 NO DOC ELDER MAL SCRN: HCPCS | Performed by: PSYCHIATRY & NEUROLOGY

## 2023-05-03 PROCEDURE — G8510 SCR DEP NEG, NO PLAN REQD: HCPCS | Performed by: PSYCHIATRY & NEUROLOGY

## 2023-05-03 PROCEDURE — 3079F DIAST BP 80-89 MM HG: CPT | Performed by: PSYCHIATRY & NEUROLOGY

## 2023-05-03 PROCEDURE — 3017F COLORECTAL CA SCREEN DOC REV: CPT | Performed by: PSYCHIATRY & NEUROLOGY

## 2023-05-03 PROCEDURE — 2022F DILAT RTA XM EVC RTNOPTHY: CPT | Performed by: PSYCHIATRY & NEUROLOGY

## 2023-05-03 PROCEDURE — G8427 DOCREV CUR MEDS BY ELIG CLIN: HCPCS | Performed by: PSYCHIATRY & NEUROLOGY

## 2023-05-03 RX ORDER — GABAPENTIN 300 MG/1
300 CAPSULE ORAL 3 TIMES DAILY
Qty: 100 CAPSULE | Refills: 5 | Status: SHIPPED | OUTPATIENT
Start: 2023-05-03

## 2023-05-03 RX ORDER — CELECOXIB 200 MG/1
200 CAPSULE ORAL
COMMUNITY

## 2023-05-04 ENCOUNTER — TELEPHONE (OUTPATIENT)
Dept: NEUROLOGY | Age: 73
End: 2023-05-04

## 2023-07-20 RX ORDER — PROPRANOLOL HYDROCHLORIDE 10 MG/1
TABLET ORAL
Qty: 180 TABLET | Refills: 3 | Status: SHIPPED | OUTPATIENT
Start: 2023-07-20

## 2023-08-22 ENCOUNTER — OFFICE VISIT (OUTPATIENT)
Age: 73
End: 2023-08-22
Payer: MEDICARE

## 2023-08-22 VITALS
WEIGHT: 226 LBS | BODY MASS INDEX: 31.64 KG/M2 | HEART RATE: 73 BPM | DIASTOLIC BLOOD PRESSURE: 70 MMHG | OXYGEN SATURATION: 96 % | TEMPERATURE: 98.1 F | SYSTOLIC BLOOD PRESSURE: 98 MMHG | HEIGHT: 71 IN | RESPIRATION RATE: 16 BRPM

## 2023-08-22 DIAGNOSIS — G25.0 ESSENTIAL TREMOR: Primary | ICD-10-CM

## 2023-08-22 DIAGNOSIS — E11.42 TYPE 2 DIABETES MELLITUS WITH DIABETIC POLYNEUROPATHY, WITHOUT LONG-TERM CURRENT USE OF INSULIN (HCC): ICD-10-CM

## 2023-08-22 PROCEDURE — 1036F TOBACCO NON-USER: CPT | Performed by: PSYCHIATRY & NEUROLOGY

## 2023-08-22 PROCEDURE — 2022F DILAT RTA XM EVC RTNOPTHY: CPT | Performed by: PSYCHIATRY & NEUROLOGY

## 2023-08-22 PROCEDURE — 1123F ACP DISCUSS/DSCN MKR DOCD: CPT | Performed by: PSYCHIATRY & NEUROLOGY

## 2023-08-22 PROCEDURE — G8427 DOCREV CUR MEDS BY ELIG CLIN: HCPCS | Performed by: PSYCHIATRY & NEUROLOGY

## 2023-08-22 PROCEDURE — 3046F HEMOGLOBIN A1C LEVEL >9.0%: CPT | Performed by: PSYCHIATRY & NEUROLOGY

## 2023-08-22 PROCEDURE — 99214 OFFICE O/P EST MOD 30 MIN: CPT | Performed by: PSYCHIATRY & NEUROLOGY

## 2023-08-22 PROCEDURE — 3078F DIAST BP <80 MM HG: CPT | Performed by: PSYCHIATRY & NEUROLOGY

## 2023-08-22 PROCEDURE — 3074F SYST BP LT 130 MM HG: CPT | Performed by: PSYCHIATRY & NEUROLOGY

## 2023-08-22 PROCEDURE — G8417 CALC BMI ABV UP PARAM F/U: HCPCS | Performed by: PSYCHIATRY & NEUROLOGY

## 2023-08-22 PROCEDURE — 3017F COLORECTAL CA SCREEN DOC REV: CPT | Performed by: PSYCHIATRY & NEUROLOGY

## 2023-08-22 RX ORDER — AMANTADINE HYDROCHLORIDE 100 MG/1
CAPSULE, GELATIN COATED ORAL
Qty: 100 CAPSULE | OUTPATIENT
Start: 2023-08-22

## 2023-08-22 RX ORDER — APIXABAN 5 MG/1
5 TABLET, FILM COATED ORAL 2 TIMES DAILY
COMMUNITY
Start: 2023-07-10

## 2023-08-22 RX ORDER — GABAPENTIN 600 MG/1
600 TABLET ORAL 3 TIMES DAILY
Qty: 270 TABLET | Refills: 1 | Status: SHIPPED | OUTPATIENT
Start: 2023-08-22 | End: 2024-02-22

## 2023-08-22 ASSESSMENT — PATIENT HEALTH QUESTIONNAIRE - PHQ9
SUM OF ALL RESPONSES TO PHQ QUESTIONS 1-9: 0
SUM OF ALL RESPONSES TO PHQ QUESTIONS 1-9: 0
SUM OF ALL RESPONSES TO PHQ9 QUESTIONS 1 & 2: 0
SUM OF ALL RESPONSES TO PHQ QUESTIONS 1-9: 0
2. FEELING DOWN, DEPRESSED OR HOPELESS: 0
1. LITTLE INTEREST OR PLEASURE IN DOING THINGS: 0
SUM OF ALL RESPONSES TO PHQ QUESTIONS 1-9: 0

## 2023-08-22 NOTE — PROGRESS NOTES
these in detail. On complete review of systems and symptoms he has had no new medical problems complications or illnesses. He has a history of essential tremor, hypertension, hyperlipidemia. Past Medical History:   Diagnosis Date    Enlarged prostate     Essential hypertension     H/O blood clots 2023    legs and lungs    Hyperlipemia     Sleep apnea       Past Surgical History:   Procedure Laterality Date    ID UNLISTED PROCEDURE ABDOMEN PERITONEUM & OMENTUM       Family History   Problem Relation Age of Onset    Cancer Mother         breast    Dementia Father     Cancer Father         bladder      Social History     Tobacco Use    Smoking status: Never    Smokeless tobacco: Never   Substance Use Topics    Alcohol use: Yes     Alcohol/week: 2.0 standard drinks         Current Outpatient Medications:     ELIQUIS 5 MG TABS tablet, Take 1 tablet by mouth 2 times daily, Disp: , Rfl:     gabapentin (NEURONTIN) 600 MG tablet, Take 1 tablet by mouth 3 times daily for 184 days.  Max Daily Amount: 1,800 mg, Disp: 270 tablet, Rfl: 1    propranolol (INDERAL) 10 MG tablet, take 1 tablet by mouth twice a day, Disp: 180 tablet, Rfl: 3    amantadine (SYMMETREL) 100 MG capsule, Take by mouth 2 times daily, Disp: , Rfl:     vitamin D 25 MCG (1000 UT) CAPS, Take by mouth daily, Disp: , Rfl:     finasteride (PROSCAR) 5 MG tablet, daily, Disp: , Rfl:     losartan (COZAAR) 50 MG tablet, daily, Disp: , Rfl:     simvastatin (ZOCOR) 40 MG tablet, 20 mg nightly., Disp: , Rfl:     tamsulosin (FLOMAX) 0.4 MG capsule, daily, Disp: , Rfl:         No Known Allergies   MRI Results (most recent):  @[facility]([id number]:1)@    @[facility]([id number]:1)@  Review of Systems:  A comprehensive review of systems was negative except for: Musculoskeletal: positive for arthralgias, myalgias, and stiff joints  Neurological: positive for dizziness and tremors  Behvioral/Psych: positive for anxiety and depression   Vitals:    08/22/23 1314   BP:

## 2023-08-29 RX ORDER — AMANTADINE HYDROCHLORIDE 100 MG/1
100 CAPSULE, GELATIN COATED ORAL 2 TIMES DAILY
Qty: 60 CAPSULE | Refills: 11 | Status: SHIPPED | OUTPATIENT
Start: 2023-08-29

## 2024-02-12 DIAGNOSIS — G25.0 ESSENTIAL TREMOR: ICD-10-CM

## 2024-02-12 DIAGNOSIS — E11.42 TYPE 2 DIABETES MELLITUS WITH DIABETIC POLYNEUROPATHY, WITHOUT LONG-TERM CURRENT USE OF INSULIN (HCC): ICD-10-CM

## 2024-02-12 RX ORDER — GABAPENTIN 600 MG/1
TABLET ORAL
Qty: 270 TABLET | Refills: 1 | Status: SHIPPED | OUTPATIENT
Start: 2024-02-12 | End: 2024-08-12

## 2024-03-04 ENCOUNTER — OFFICE VISIT (OUTPATIENT)
Age: 74
End: 2024-03-04
Payer: MEDICARE

## 2024-03-04 VITALS
BODY MASS INDEX: 32.3 KG/M2 | SYSTOLIC BLOOD PRESSURE: 136 MMHG | HEIGHT: 71 IN | OXYGEN SATURATION: 96 % | WEIGHT: 230.7 LBS | TEMPERATURE: 97.4 F | DIASTOLIC BLOOD PRESSURE: 84 MMHG | HEART RATE: 80 BPM | RESPIRATION RATE: 17 BRPM

## 2024-03-04 DIAGNOSIS — M47.27 LUMBOSACRAL RADICULOPATHY DUE TO DEGENERATIVE JOINT DISEASE OF SPINE: Primary | ICD-10-CM

## 2024-03-04 DIAGNOSIS — E11.42 DIABETIC PERIPHERAL NEUROPATHY (HCC): ICD-10-CM

## 2024-03-04 DIAGNOSIS — I65.23 STENOSIS OF BOTH INTERNAL CAROTID ARTERIES: ICD-10-CM

## 2024-03-04 DIAGNOSIS — G25.0 BENIGN ESSENTIAL TREMOR SYNDROME: ICD-10-CM

## 2024-03-04 PROCEDURE — 3046F HEMOGLOBIN A1C LEVEL >9.0%: CPT | Performed by: PSYCHIATRY & NEUROLOGY

## 2024-03-04 PROCEDURE — G8417 CALC BMI ABV UP PARAM F/U: HCPCS | Performed by: PSYCHIATRY & NEUROLOGY

## 2024-03-04 PROCEDURE — G8427 DOCREV CUR MEDS BY ELIG CLIN: HCPCS | Performed by: PSYCHIATRY & NEUROLOGY

## 2024-03-04 PROCEDURE — G8484 FLU IMMUNIZE NO ADMIN: HCPCS | Performed by: PSYCHIATRY & NEUROLOGY

## 2024-03-04 PROCEDURE — 2022F DILAT RTA XM EVC RTNOPTHY: CPT | Performed by: PSYCHIATRY & NEUROLOGY

## 2024-03-04 PROCEDURE — 3017F COLORECTAL CA SCREEN DOC REV: CPT | Performed by: PSYCHIATRY & NEUROLOGY

## 2024-03-04 PROCEDURE — 1123F ACP DISCUSS/DSCN MKR DOCD: CPT | Performed by: PSYCHIATRY & NEUROLOGY

## 2024-03-04 PROCEDURE — 3075F SYST BP GE 130 - 139MM HG: CPT | Performed by: PSYCHIATRY & NEUROLOGY

## 2024-03-04 PROCEDURE — 99213 OFFICE O/P EST LOW 20 MIN: CPT | Performed by: PSYCHIATRY & NEUROLOGY

## 2024-03-04 PROCEDURE — 1036F TOBACCO NON-USER: CPT | Performed by: PSYCHIATRY & NEUROLOGY

## 2024-03-04 PROCEDURE — 3079F DIAST BP 80-89 MM HG: CPT | Performed by: PSYCHIATRY & NEUROLOGY

## 2024-03-04 ASSESSMENT — PATIENT HEALTH QUESTIONNAIRE - PHQ9
1. LITTLE INTEREST OR PLEASURE IN DOING THINGS: 0
SUM OF ALL RESPONSES TO PHQ QUESTIONS 1-9: 0
2. FEELING DOWN, DEPRESSED OR HOPELESS: 0
SUM OF ALL RESPONSES TO PHQ9 QUESTIONS 1 & 2: 0
SUM OF ALL RESPONSES TO PHQ QUESTIONS 1-9: 0

## 2024-03-05 NOTE — PROGRESS NOTES
No Known Allergies   MRI Results (most recent):  @[facility]([id number]:1)@    @[facility]([id number]:1)@  Review of Systems:  A comprehensive review of systems was negative except for: Musculoskeletal: positive for arthralgias, myalgias, and stiff joints  Neurological: positive for dizziness and tremors  Behvioral/Psych: positive for anxiety and depression   Vitals:    03/04/24 1501 03/04/24 1520   BP: (!) 140/92 136/84   Site: Right Upper Arm    Position: Sitting    Cuff Size: Medium Adult    Pulse: 80    Resp: 17    Temp: 97.4 °F (36.3 °C)    TempSrc: Temporal    SpO2: 96%    Weight: 104.6 kg (230 lb 11.2 oz)    Height: 1.803 m (5' 11\")      Objective:     I      NEUROLOGICAL EXAM:     Appearance:  The patient is well developed, well nourished, provides a coherent history and is in no acute distress.   Mental Status: Oriented to time, place and person, and the president, cognitive function is normal, speech is fluent. Mood and affect appropriate.   Cranial Nerves:   Intact visual fields. Fundi are benign, discs are flat. SHELLIE, EOM's full, no nystagmus, no ptosis. Facial sensation is normal. Corneal reflexes are not tested. Facial movement is symmetric. Hearing is normal bilaterally. Palate is midline with normal sternocleidomastoid and trapezius muscles are normal. Tongue is midline.  Neck without meningismus or bruits  Temporal arteries are not tender or enlarged    Motor:  5/5 strength in upper and lower proximal and distal muscles. Normal bulk and tone. No fasciculations.  Patient does move slowly because of his back pain at times.  Rapid altering movement is slow but symmetric bilaterally   Reflexes:   Deep tendon reflexes 2+/4 and symmetrical.  No babinski or clonus present  Patient has positive head hanging position vertigo when he sits up quickly, worse with head tilted to the right suggesting right vestibular dysfunction  Hearing is mildly decreased only   Sensory:   Mildly abnormal to touch,

## 2024-07-15 RX ORDER — PROPRANOLOL HYDROCHLORIDE 10 MG/1
TABLET ORAL
Qty: 180 TABLET | Refills: 3 | Status: SHIPPED | OUTPATIENT
Start: 2024-07-15

## 2024-08-26 RX ORDER — AMANTADINE HYDROCHLORIDE 100 MG/1
100 CAPSULE, GELATIN COATED ORAL 2 TIMES DAILY
Qty: 60 CAPSULE | Refills: 11 | Status: SHIPPED | OUTPATIENT
Start: 2024-08-26

## 2024-08-26 NOTE — TELEPHONE ENCOUNTER
Last office visit: 03/04/2024  Next office visit: 03/04/2025  Last med refill: 08/29/2023   details…

## 2024-10-23 DIAGNOSIS — G25.0 ESSENTIAL TREMOR: ICD-10-CM

## 2024-10-23 DIAGNOSIS — E11.42 TYPE 2 DIABETES MELLITUS WITH DIABETIC POLYNEUROPATHY, WITHOUT LONG-TERM CURRENT USE OF INSULIN (HCC): ICD-10-CM

## 2024-10-23 RX ORDER — GABAPENTIN 600 MG/1
TABLET ORAL
Qty: 270 TABLET | Refills: 1 | Status: SHIPPED | OUTPATIENT
Start: 2024-10-23 | End: 2025-04-20

## 2025-03-04 ENCOUNTER — OFFICE VISIT (OUTPATIENT)
Age: 75
End: 2025-03-04
Payer: MEDICARE

## 2025-03-04 VITALS
HEIGHT: 71 IN | RESPIRATION RATE: 19 BRPM | HEART RATE: 81 BPM | BODY MASS INDEX: 32.4 KG/M2 | DIASTOLIC BLOOD PRESSURE: 62 MMHG | TEMPERATURE: 97.8 F | WEIGHT: 231.4 LBS | SYSTOLIC BLOOD PRESSURE: 112 MMHG | OXYGEN SATURATION: 96 %

## 2025-03-04 DIAGNOSIS — G25.0 BENIGN ESSENTIAL TREMOR SYNDROME: ICD-10-CM

## 2025-03-04 DIAGNOSIS — R42 DIZZINESS AND GIDDINESS: ICD-10-CM

## 2025-03-04 DIAGNOSIS — G60.8 IDIOPATHIC SMALL FIBER SENSORY NEUROPATHY: Primary | ICD-10-CM

## 2025-03-04 DIAGNOSIS — M47.27 LUMBOSACRAL RADICULOPATHY DUE TO DEGENERATIVE JOINT DISEASE OF SPINE: ICD-10-CM

## 2025-03-04 DIAGNOSIS — I65.23 STENOSIS OF BOTH INTERNAL CAROTID ARTERIES: ICD-10-CM

## 2025-03-04 PROCEDURE — 1036F TOBACCO NON-USER: CPT | Performed by: PSYCHIATRY & NEUROLOGY

## 2025-03-04 PROCEDURE — 3078F DIAST BP <80 MM HG: CPT | Performed by: PSYCHIATRY & NEUROLOGY

## 2025-03-04 PROCEDURE — G8427 DOCREV CUR MEDS BY ELIG CLIN: HCPCS | Performed by: PSYCHIATRY & NEUROLOGY

## 2025-03-04 PROCEDURE — 3074F SYST BP LT 130 MM HG: CPT | Performed by: PSYCHIATRY & NEUROLOGY

## 2025-03-04 PROCEDURE — 1125F AMNT PAIN NOTED PAIN PRSNT: CPT | Performed by: PSYCHIATRY & NEUROLOGY

## 2025-03-04 PROCEDURE — 1123F ACP DISCUSS/DSCN MKR DOCD: CPT | Performed by: PSYCHIATRY & NEUROLOGY

## 2025-03-04 PROCEDURE — 1160F RVW MEDS BY RX/DR IN RCRD: CPT | Performed by: PSYCHIATRY & NEUROLOGY

## 2025-03-04 PROCEDURE — 3017F COLORECTAL CA SCREEN DOC REV: CPT | Performed by: PSYCHIATRY & NEUROLOGY

## 2025-03-04 PROCEDURE — 99213 OFFICE O/P EST LOW 20 MIN: CPT | Performed by: PSYCHIATRY & NEUROLOGY

## 2025-03-04 PROCEDURE — G8417 CALC BMI ABV UP PARAM F/U: HCPCS | Performed by: PSYCHIATRY & NEUROLOGY

## 2025-03-04 PROCEDURE — 1159F MED LIST DOCD IN RCRD: CPT | Performed by: PSYCHIATRY & NEUROLOGY

## 2025-03-04 ASSESSMENT — PATIENT HEALTH QUESTIONNAIRE - PHQ9
SUM OF ALL RESPONSES TO PHQ QUESTIONS 1-9: 0
SUM OF ALL RESPONSES TO PHQ QUESTIONS 1-9: 0
2. FEELING DOWN, DEPRESSED OR HOPELESS: NOT AT ALL
SUM OF ALL RESPONSES TO PHQ QUESTIONS 1-9: 0
1. LITTLE INTEREST OR PLEASURE IN DOING THINGS: NOT AT ALL
SUM OF ALL RESPONSES TO PHQ QUESTIONS 1-9: 0

## 2025-03-04 NOTE — PROGRESS NOTES
Consult  REFERRED BY:  Domo Fernandez MD    CHIEF COMPLAINT: Patient seen for follow-up after his recent right focused ultrasound brain radiation for left-sided tremor and he has done better with that, but still has mild tremor on the left side and may be moderate tremor on the right side is not 100% happy with his success rate, but he was getting numbness in the left side of his tongue and face and still has a little residual there and they stopped the therapy at that time because of his symptoms.  He still taking his Inderal 10 mg twice a day and his amantadine 100 mg once a day and cannot take primidone because he is on Eliquis, and takes gabapentin 600 mg once a day at maximum, for the tremor.  He does not think he needs other medication now but still has a fair tremor on his dominant side on the right.      Subjective:     Karan Neely is a 74 y.o. right-handed  male seen for evaluation at the request of Dr. Fernandez of new problem of Patient seen for follow-up after his recent right focused ultrasound brain radiation for left-sided tremor and he has done better with that, but still has mild tremor on the left side and may be moderate tremor on the right side is not 100% happy with his success rate, but he was getting numbness in the left side of his tongue and face and still has a little residual there and they stopped the therapy at that time because of his symptoms.  He still taking his Inderal 10 mg twice a day and his amantadine 100 mg once a day and cannot take primidone because he is on Eliquis, and takes gabapentin 600 mg once a day at maximum, for the tremor.  He does not think he needs other medication now but still has a fair tremor on his dominant side on the right.  He has had no further medical problems and no progression of his blood clots and is stable on his anticoagulation.  Patient last seen after he developed pulmonary emboli and DVTs, and need to be anticoagulated and had to be

## 2025-04-08 DIAGNOSIS — E11.42 TYPE 2 DIABETES MELLITUS WITH DIABETIC POLYNEUROPATHY, WITHOUT LONG-TERM CURRENT USE OF INSULIN (HCC): ICD-10-CM

## 2025-04-08 DIAGNOSIS — G25.0 ESSENTIAL TREMOR: ICD-10-CM

## 2025-04-08 RX ORDER — GABAPENTIN 600 MG/1
TABLET ORAL
Qty: 90 TABLET | Refills: 11 | Status: SHIPPED | OUTPATIENT
Start: 2025-04-08 | End: 2026-04-08

## 2025-06-26 ENCOUNTER — TELEPHONE (OUTPATIENT)
Age: 75
End: 2025-06-26

## 2025-07-09 ENCOUNTER — PATIENT MESSAGE (OUTPATIENT)
Age: 75
End: 2025-07-09

## 2025-07-15 RX ORDER — AMANTADINE HYDROCHLORIDE 100 MG/1
100 CAPSULE, GELATIN COATED ORAL 2 TIMES DAILY
Qty: 60 CAPSULE | Refills: 11 | Status: SHIPPED | OUTPATIENT
Start: 2025-07-15

## 2025-07-15 RX ORDER — PROPRANOLOL HYDROCHLORIDE 10 MG/1
10 TABLET ORAL 2 TIMES DAILY
Qty: 180 TABLET | Refills: 3 | Status: SHIPPED | OUTPATIENT
Start: 2025-07-15

## 2025-07-15 NOTE — TELEPHONE ENCOUNTER
Last office visit: 03/04/2025  Next office visit: 03/04/2026  Last med refill:     Hi Dr Palomino,  Would you please send new prescriptions for Propranolol Hydrochloride and Amantadine Hydrochloride to the [facility] pharmacy at [address] in [place], [address]. The [facility] that I was going to closed, so I'm now going to a new pharmacy. [facility] says that they can't transfer these drugs; they need a new prescription, even though they were able to transfer the Gabapentin.  Thanks - hope you're doing well.   Karan Neely